# Patient Record
Sex: MALE | Race: WHITE | NOT HISPANIC OR LATINO | Employment: OTHER | URBAN - METROPOLITAN AREA
[De-identification: names, ages, dates, MRNs, and addresses within clinical notes are randomized per-mention and may not be internally consistent; named-entity substitution may affect disease eponyms.]

---

## 2017-05-22 ENCOUNTER — TRANSCRIBE ORDERS (OUTPATIENT)
Dept: LAB | Facility: HOSPITAL | Age: 62
End: 2017-05-22

## 2017-05-22 ENCOUNTER — LAB (OUTPATIENT)
Dept: LAB | Facility: HOSPITAL | Age: 62
End: 2017-05-22

## 2017-05-22 DIAGNOSIS — E55.9 UNSPECIFIED VITAMIN D DEFICIENCY: ICD-10-CM

## 2017-05-22 DIAGNOSIS — E11.9 DIABETES MELLITUS WITHOUT COMPLICATION (HCC): ICD-10-CM

## 2017-05-22 DIAGNOSIS — E03.9 UNSPECIFIED HYPOTHYROIDISM: ICD-10-CM

## 2017-05-22 DIAGNOSIS — E03.9 UNSPECIFIED HYPOTHYROIDISM: Primary | ICD-10-CM

## 2017-05-22 LAB
ALBUMIN SERPL-MCNC: 3.6 G/DL (ref 3.2–4.8)
ALBUMIN/GLOB SERPL: 1.3 G/DL (ref 1.5–2.5)
ALP SERPL-CCNC: 108 U/L (ref 25–100)
ALT SERPL W P-5'-P-CCNC: 40 U/L (ref 7–40)
ANION GAP SERPL CALCULATED.3IONS-SCNC: 2 MMOL/L (ref 3–11)
AST SERPL-CCNC: 34 U/L (ref 0–33)
BILIRUB SERPL-MCNC: 0.4 MG/DL (ref 0.3–1.2)
BUN BLD-MCNC: 54 MG/DL (ref 9–23)
BUN/CREAT SERPL: 9.8 (ref 7–25)
CALCIUM SPEC-SCNC: 9.2 MG/DL (ref 8.7–10.4)
CHLORIDE SERPL-SCNC: 105 MMOL/L (ref 99–109)
CO2 SERPL-SCNC: 32 MMOL/L (ref 20–31)
CREAT BLD-MCNC: 5.5 MG/DL (ref 0.6–1.3)
GFR SERPL CREATININE-BSD FRML MDRD: 11 ML/MIN/1.73
GLOBULIN UR ELPH-MCNC: 2.7 GM/DL
GLUCOSE BLD-MCNC: 114 MG/DL (ref 70–100)
HBA1C MFR BLD: 6 % (ref 4.8–5.6)
POTASSIUM BLD-SCNC: 4.3 MMOL/L (ref 3.5–5.5)
PROT SERPL-MCNC: 6.3 G/DL (ref 5.7–8.2)
SODIUM BLD-SCNC: 139 MMOL/L (ref 132–146)
T4 FREE SERPL-MCNC: 1.24 NG/DL (ref 0.89–1.76)
TSH SERPL DL<=0.05 MIU/L-ACNC: 1 MIU/ML (ref 0.35–5.35)

## 2017-05-22 PROCEDURE — 83036 HEMOGLOBIN GLYCOSYLATED A1C: CPT | Performed by: FAMILY MEDICINE

## 2017-05-22 PROCEDURE — 84439 ASSAY OF FREE THYROXINE: CPT | Performed by: FAMILY MEDICINE

## 2017-05-22 PROCEDURE — 80053 COMPREHEN METABOLIC PANEL: CPT | Performed by: FAMILY MEDICINE

## 2017-05-22 PROCEDURE — 36415 COLL VENOUS BLD VENIPUNCTURE: CPT

## 2017-05-22 PROCEDURE — 84443 ASSAY THYROID STIM HORMONE: CPT | Performed by: FAMILY MEDICINE

## 2017-05-22 PROCEDURE — 84481 FREE ASSAY (FT-3): CPT | Performed by: FAMILY MEDICINE

## 2017-05-22 PROCEDURE — 83970 ASSAY OF PARATHORMONE: CPT | Performed by: FAMILY MEDICINE

## 2017-05-23 LAB
PTH-INTACT SERPL-MCNC: 181 PG/ML (ref 15–65)
T3FREE SERPL-MCNC: 2.5 PG/ML (ref 2–4.4)

## 2017-09-29 ENCOUNTER — TRANSCRIBE ORDERS (OUTPATIENT)
Dept: LAB | Facility: HOSPITAL | Age: 62
End: 2017-09-29

## 2017-09-29 ENCOUNTER — LAB (OUTPATIENT)
Dept: LAB | Facility: HOSPITAL | Age: 62
End: 2017-09-29

## 2017-09-29 DIAGNOSIS — R80.8 OTHER PROTEINURIA: ICD-10-CM

## 2017-09-29 DIAGNOSIS — B19.20 UNSPECIFIED VIRAL HEPATITIS C WITHOUT HEPATIC COMA: ICD-10-CM

## 2017-09-29 DIAGNOSIS — N18.6 END STAGE RENAL DISEASE (HCC): Primary | ICD-10-CM

## 2017-09-29 DIAGNOSIS — E13.8 DIABETES MELLITUS OF OTHER TYPE WITH COMPLICATION: ICD-10-CM

## 2017-09-29 DIAGNOSIS — I10 ESSENTIAL HYPERTENSION, MALIGNANT: ICD-10-CM

## 2017-09-29 DIAGNOSIS — N18.6 END STAGE RENAL DISEASE (HCC): ICD-10-CM

## 2017-09-29 LAB — URATE SERPL-MCNC: 5.6 MG/DL (ref 3.7–9.2)

## 2017-09-29 PROCEDURE — 84165 PROTEIN E-PHORESIS SERUM: CPT | Performed by: INTERNAL MEDICINE

## 2017-09-29 PROCEDURE — 87040 BLOOD CULTURE FOR BACTERIA: CPT | Performed by: INTERNAL MEDICINE

## 2017-09-29 PROCEDURE — 84550 ASSAY OF BLOOD/URIC ACID: CPT | Performed by: INTERNAL MEDICINE

## 2017-09-29 PROCEDURE — 80171 DRUG SCREEN QUANT GABAPENTIN: CPT | Performed by: INTERNAL MEDICINE

## 2017-09-29 PROCEDURE — 36415 COLL VENOUS BLD VENIPUNCTURE: CPT

## 2017-09-29 PROCEDURE — 84155 ASSAY OF PROTEIN SERUM: CPT | Performed by: INTERNAL MEDICINE

## 2017-09-29 PROCEDURE — 86334 IMMUNOFIX E-PHORESIS SERUM: CPT | Performed by: INTERNAL MEDICINE

## 2017-10-03 LAB
ALBUMIN SERPL-MCNC: 3.4 G/DL (ref 2.9–4.4)
ALBUMIN/GLOB SERPL: 1 {RATIO} (ref 0.7–1.7)
ALPHA1 GLOB FLD ELPH-MCNC: 0.4 G/DL (ref 0–0.4)
ALPHA2 GLOB SERPL ELPH-MCNC: 1 G/DL (ref 0.4–1)
B-GLOBULIN SERPL ELPH-MCNC: 0.9 G/DL (ref 0.7–1.3)
GABAPENTIN SERPLBLD-MCNC: 5.2 UG/ML (ref 4–16)
GAMMA GLOB SERPL ELPH-MCNC: 1.2 G/DL (ref 0.4–1.8)
GLOBULIN SER CALC-MCNC: 3.5 G/DL (ref 2.2–3.9)
IGA SERPL-MCNC: 215 MG/DL (ref 61–437)
IGG SERPL-MCNC: 1039 MG/DL (ref 700–1600)
IGM SERPL-MCNC: 59 MG/DL (ref 20–172)
INTERPRETATION SERPL IEP-IMP: NORMAL
Lab: NORMAL
M-SPIKE: NORMAL G/DL
PROT SERPL-MCNC: 6.9 G/DL (ref 6–8.5)

## 2017-10-04 LAB — BACTERIA SPEC AEROBE CULT: NORMAL

## 2018-07-12 ENCOUNTER — TRANSCRIBE ORDERS (OUTPATIENT)
Dept: LAB | Facility: HOSPITAL | Age: 63
End: 2018-07-12

## 2018-07-12 ENCOUNTER — LAB (OUTPATIENT)
Dept: LAB | Facility: HOSPITAL | Age: 63
End: 2018-07-12

## 2018-07-12 DIAGNOSIS — N13.8 ENLARGED PROSTATE WITH URINARY OBSTRUCTION: Primary | ICD-10-CM

## 2018-07-12 DIAGNOSIS — N40.1 ENLARGED PROSTATE WITH URINARY OBSTRUCTION: ICD-10-CM

## 2018-07-12 DIAGNOSIS — N40.1 ENLARGED PROSTATE WITH URINARY OBSTRUCTION: Primary | ICD-10-CM

## 2018-07-12 DIAGNOSIS — E55.9 AVITAMINOSIS D: ICD-10-CM

## 2018-07-12 DIAGNOSIS — N13.8 ENLARGED PROSTATE WITH URINARY OBSTRUCTION: ICD-10-CM

## 2018-07-12 LAB
25(OH)D3 SERPL-MCNC: 32.1 NG/ML
ALBUMIN SERPL-MCNC: 4 G/DL (ref 3.2–4.8)
ALBUMIN/GLOB SERPL: 1.5 G/DL (ref 1.5–2.5)
ALP SERPL-CCNC: 148 U/L (ref 25–100)
ALT SERPL W P-5'-P-CCNC: 50 U/L (ref 7–40)
ANION GAP SERPL CALCULATED.3IONS-SCNC: 12 MMOL/L (ref 3–11)
ARTICHOKE IGE QN: 22 MG/DL (ref 0–130)
AST SERPL-CCNC: 41 U/L (ref 0–33)
BASOPHILS # BLD AUTO: 0.06 10*3/MM3 (ref 0–0.2)
BASOPHILS NFR BLD AUTO: 0.5 % (ref 0–1)
BILIRUB SERPL-MCNC: 0.4 MG/DL (ref 0.3–1.2)
BUN BLD-MCNC: 50 MG/DL (ref 9–23)
BUN/CREAT SERPL: 7.8 (ref 7–25)
CA-I SERPL ISE-MCNC: 1.17 MMOL/L (ref 1.12–1.32)
CALCIUM SPEC-SCNC: 8.5 MG/DL (ref 8.7–10.4)
CHLORIDE SERPL-SCNC: 105 MMOL/L (ref 99–109)
CHOLEST SERPL-MCNC: 93 MG/DL (ref 0–200)
CK SERPL-CCNC: 77 U/L (ref 26–174)
CO2 SERPL-SCNC: 24 MMOL/L (ref 20–31)
CREAT BLD-MCNC: 6.37 MG/DL (ref 0.6–1.3)
DEPRECATED RDW RBC AUTO: 49.9 FL (ref 37–54)
EOSINOPHIL # BLD AUTO: 0.88 10*3/MM3 (ref 0–0.3)
EOSINOPHIL NFR BLD AUTO: 7.8 % (ref 0–3)
ERYTHROCYTE [DISTWIDTH] IN BLOOD BY AUTOMATED COUNT: 14.5 % (ref 11.3–14.5)
FERRITIN SERPL-MCNC: 70 NG/ML (ref 22–322)
FOLATE SERPL-MCNC: >24 NG/ML (ref 3.2–20)
GFR SERPL CREATININE-BSD FRML MDRD: 9 ML/MIN/1.73
GLOBULIN UR ELPH-MCNC: 2.6 GM/DL
GLUCOSE BLD-MCNC: 68 MG/DL (ref 70–100)
HBA1C MFR BLD: 6.9 % (ref 4.8–5.6)
HCT VFR BLD AUTO: 41.5 % (ref 38.9–50.9)
HDLC SERPL-MCNC: 26 MG/DL (ref 40–60)
HGB BLD-MCNC: 13.7 G/DL (ref 13.1–17.5)
IMM GRANULOCYTES # BLD: 0.15 10*3/MM3 (ref 0–0.03)
IMM GRANULOCYTES NFR BLD: 1.3 % (ref 0–0.6)
IRON 24H UR-MRATE: 71 MCG/DL (ref 50–175)
IRON SATN MFR SERPL: 17 % (ref 20–50)
LYMPHOCYTES # BLD AUTO: 2.97 10*3/MM3 (ref 0.6–4.8)
LYMPHOCYTES NFR BLD AUTO: 26.4 % (ref 24–44)
MCH RBC QN AUTO: 31.5 PG (ref 27–31)
MCHC RBC AUTO-ENTMCNC: 33 G/DL (ref 32–36)
MCV RBC AUTO: 95.4 FL (ref 80–99)
MONOCYTES # BLD AUTO: 1.09 10*3/MM3 (ref 0–1)
MONOCYTES NFR BLD AUTO: 9.7 % (ref 0–12)
NEUTROPHILS # BLD AUTO: 6.26 10*3/MM3 (ref 1.5–8.3)
NEUTROPHILS NFR BLD AUTO: 55.6 % (ref 41–71)
PHOSPHATE SERPL-MCNC: 5.2 MG/DL (ref 2.4–5.1)
PLATELET # BLD AUTO: 255 10*3/MM3 (ref 150–450)
PMV BLD AUTO: 10.6 FL (ref 6–12)
POTASSIUM BLD-SCNC: 4.1 MMOL/L (ref 3.5–5.5)
PROT SERPL-MCNC: 6.6 G/DL (ref 5.7–8.2)
PTH-INTACT SERPL-MCNC: 331.8 PG/ML (ref 11–80)
RBC # BLD AUTO: 4.35 10*6/MM3 (ref 4.2–5.76)
SODIUM BLD-SCNC: 141 MMOL/L (ref 132–146)
T4 FREE SERPL-MCNC: 1.04 NG/DL (ref 0.89–1.76)
TIBC SERPL-MCNC: 413 MCG/DL (ref 250–450)
TRIGL SERPL-MCNC: 314 MG/DL (ref 0–150)
TSH SERPL DL<=0.05 MIU/L-ACNC: 1.57 MIU/ML (ref 0.35–5.35)
VIT B12 BLD-MCNC: 555 PG/ML (ref 211–911)
WBC NRBC COR # BLD: 11.26 10*3/MM3 (ref 3.5–10.8)

## 2018-07-12 PROCEDURE — 82652 VIT D 1 25-DIHYDROXY: CPT

## 2018-07-12 PROCEDURE — 82550 ASSAY OF CK (CPK): CPT

## 2018-07-12 PROCEDURE — 80061 LIPID PANEL: CPT

## 2018-07-12 PROCEDURE — 82746 ASSAY OF FOLIC ACID SERUM: CPT

## 2018-07-12 PROCEDURE — 83550 IRON BINDING TEST: CPT

## 2018-07-12 PROCEDURE — 84439 ASSAY OF FREE THYROXINE: CPT

## 2018-07-12 PROCEDURE — G0103 PSA SCREENING: HCPCS

## 2018-07-12 PROCEDURE — 82330 ASSAY OF CALCIUM: CPT

## 2018-07-12 PROCEDURE — 36415 COLL VENOUS BLD VENIPUNCTURE: CPT

## 2018-07-12 PROCEDURE — 83540 ASSAY OF IRON: CPT

## 2018-07-12 PROCEDURE — 85025 COMPLETE CBC W/AUTO DIFF WBC: CPT

## 2018-07-12 PROCEDURE — 82306 VITAMIN D 25 HYDROXY: CPT

## 2018-07-12 PROCEDURE — 84481 FREE ASSAY (FT-3): CPT

## 2018-07-12 PROCEDURE — 36415 COLL VENOUS BLD VENIPUNCTURE: CPT | Performed by: FAMILY MEDICINE

## 2018-07-12 PROCEDURE — 83036 HEMOGLOBIN GLYCOSYLATED A1C: CPT

## 2018-07-12 PROCEDURE — 82607 VITAMIN B-12: CPT

## 2018-07-12 PROCEDURE — 80053 COMPREHEN METABOLIC PANEL: CPT | Performed by: FAMILY MEDICINE

## 2018-07-12 PROCEDURE — 84100 ASSAY OF PHOSPHORUS: CPT

## 2018-07-12 PROCEDURE — 82728 ASSAY OF FERRITIN: CPT

## 2018-07-12 PROCEDURE — 84443 ASSAY THYROID STIM HORMONE: CPT

## 2018-07-12 PROCEDURE — 83970 ASSAY OF PARATHORMONE: CPT

## 2018-07-12 PROCEDURE — 84153 ASSAY OF PSA TOTAL: CPT

## 2018-07-13 LAB
1,25(OH)2D3 SERPL-MCNC: 12.2 PG/ML (ref 19.9–79.3)
T3FREE SERPL-MCNC: 2.9 PG/ML (ref 2–4.4)

## 2018-08-21 ENCOUNTER — LAB (OUTPATIENT)
Dept: LAB | Facility: HOSPITAL | Age: 63
End: 2018-08-21

## 2018-08-21 ENCOUNTER — HOSPITAL ENCOUNTER (OUTPATIENT)
Dept: CARDIOLOGY | Facility: HOSPITAL | Age: 63
Discharge: HOME | End: 2018-08-21
Attending: OTOLARYNGOLOGY | Admitting: OTOLARYNGOLOGY

## 2018-08-21 ENCOUNTER — TRANSCRIBE ORDERS (OUTPATIENT)
Dept: ADMINISTRATIVE | Facility: HOSPITAL | Age: 63
End: 2018-08-21

## 2018-08-21 ENCOUNTER — TRANSCRIBE ORDERS (OUTPATIENT)
Dept: LAB | Facility: HOSPITAL | Age: 63
End: 2018-08-21

## 2018-08-21 DIAGNOSIS — Z01.818 PRE-OP TESTING: ICD-10-CM

## 2018-08-21 DIAGNOSIS — I10 HYPERTENSION, UNSPECIFIED TYPE: ICD-10-CM

## 2018-08-21 DIAGNOSIS — Z01.812 PRE-OPERATIVE LABORATORY EXAMINATION: Primary | ICD-10-CM

## 2018-08-21 DIAGNOSIS — Z01.812 PRE-OPERATIVE LABORATORY EXAMINATION: ICD-10-CM

## 2018-08-21 DIAGNOSIS — Z01.818 PRE-OP TESTING: Primary | ICD-10-CM

## 2018-08-21 LAB
ALBUMIN SERPL-MCNC: 3.81 G/DL (ref 3.2–4.8)
ALBUMIN/GLOB SERPL: 1.5 G/DL (ref 1.5–2.5)
ALP SERPL-CCNC: 134 U/L (ref 25–100)
ALT SERPL W P-5'-P-CCNC: 47 U/L (ref 7–40)
ANION GAP SERPL CALCULATED.3IONS-SCNC: 12 MMOL/L (ref 3–11)
AST SERPL-CCNC: 38 U/L (ref 0–33)
BASOPHILS # BLD AUTO: 0.06 10*3/MM3 (ref 0–0.2)
BASOPHILS NFR BLD AUTO: 0.6 % (ref 0–1)
BILIRUB SERPL-MCNC: 0.5 MG/DL (ref 0.3–1.2)
BUN BLD-MCNC: 50 MG/DL (ref 9–23)
BUN/CREAT SERPL: 8.9 (ref 7–25)
CALCIUM SPEC-SCNC: 8.5 MG/DL (ref 8.7–10.4)
CHLORIDE SERPL-SCNC: 98 MMOL/L (ref 99–109)
CO2 SERPL-SCNC: 27 MMOL/L (ref 20–31)
CREAT BLD-MCNC: 5.62 MG/DL (ref 0.6–1.3)
DEPRECATED RDW RBC AUTO: 47.1 FL (ref 37–54)
EOSINOPHIL # BLD AUTO: 0.71 10*3/MM3 (ref 0–0.3)
EOSINOPHIL NFR BLD AUTO: 6.7 % (ref 0–3)
ERYTHROCYTE [DISTWIDTH] IN BLOOD BY AUTOMATED COUNT: 14 % (ref 11.3–14.5)
GFR SERPL CREATININE-BSD FRML MDRD: 10 ML/MIN/1.73
GLOBULIN UR ELPH-MCNC: 2.6 GM/DL
GLUCOSE BLD-MCNC: 186 MG/DL (ref 70–100)
HCT VFR BLD AUTO: 38.9 % (ref 38.9–50.9)
HGB BLD-MCNC: 12.9 G/DL (ref 13.1–17.5)
IMM GRANULOCYTES # BLD: 0.19 10*3/MM3 (ref 0–0.03)
IMM GRANULOCYTES NFR BLD: 1.8 % (ref 0–0.6)
LYMPHOCYTES # BLD AUTO: 2.18 10*3/MM3 (ref 0.6–4.8)
LYMPHOCYTES NFR BLD AUTO: 20.5 % (ref 24–44)
MCH RBC QN AUTO: 31 PG (ref 27–31)
MCHC RBC AUTO-ENTMCNC: 33.2 G/DL (ref 32–36)
MCV RBC AUTO: 93.5 FL (ref 80–99)
MONOCYTES # BLD AUTO: 1.13 10*3/MM3 (ref 0–1)
MONOCYTES NFR BLD AUTO: 10.6 % (ref 0–12)
NEUTROPHILS # BLD AUTO: 6.57 10*3/MM3 (ref 1.5–8.3)
NEUTROPHILS NFR BLD AUTO: 61.6 % (ref 41–71)
PLATELET # BLD AUTO: 257 10*3/MM3 (ref 150–450)
PMV BLD AUTO: 10.6 FL (ref 6–12)
POTASSIUM BLD-SCNC: 4.2 MMOL/L (ref 3.5–5.5)
PROT SERPL-MCNC: 6.4 G/DL (ref 5.7–8.2)
RBC # BLD AUTO: 4.16 10*6/MM3 (ref 4.2–5.76)
SODIUM BLD-SCNC: 137 MMOL/L (ref 132–146)
WBC NRBC COR # BLD: 10.65 10*3/MM3 (ref 3.5–10.8)

## 2018-08-21 PROCEDURE — 93005 ELECTROCARDIOGRAM TRACING: CPT | Performed by: OTOLARYNGOLOGY

## 2018-08-21 PROCEDURE — 36415 COLL VENOUS BLD VENIPUNCTURE: CPT

## 2018-08-21 PROCEDURE — 93010 ELECTROCARDIOGRAM REPORT: CPT | Performed by: INTERNAL MEDICINE

## 2018-08-21 PROCEDURE — 80053 COMPREHEN METABOLIC PANEL: CPT

## 2018-08-21 PROCEDURE — 85025 COMPLETE CBC W/AUTO DIFF WBC: CPT

## 2018-09-05 LAB
PSA SERPL-MCNC: 0.78 NG/ML (ref 0–4)
PSA SERPL-MCNC: NORMAL NG/ML (ref 0–4)

## 2019-02-07 ENCOUNTER — TRANSCRIBE ORDERS (OUTPATIENT)
Dept: ADMINISTRATIVE | Facility: HOSPITAL | Age: 64
End: 2019-02-07

## 2019-02-07 DIAGNOSIS — R91.1 NODULE OF LEFT LUNG: Primary | ICD-10-CM

## 2019-02-13 ENCOUNTER — HOSPITAL ENCOUNTER (OUTPATIENT)
Dept: CT IMAGING | Facility: HOSPITAL | Age: 64
Discharge: HOME | End: 2019-02-13
Admitting: FAMILY MEDICINE

## 2019-02-13 DIAGNOSIS — R91.1 NODULE OF LEFT LUNG: ICD-10-CM

## 2019-02-13 PROCEDURE — 71250 CT THORAX DX C-: CPT

## 2019-02-18 ENCOUNTER — TRANSCRIBE ORDERS (OUTPATIENT)
Dept: LAB | Facility: HOSPITAL | Age: 64
End: 2019-02-18

## 2019-02-18 ENCOUNTER — LAB (OUTPATIENT)
Dept: LAB | Facility: HOSPITAL | Age: 64
End: 2019-02-18

## 2019-02-18 DIAGNOSIS — I51.9 MYXEDEMA HEART DISEASE: ICD-10-CM

## 2019-02-18 DIAGNOSIS — I51.9 MYXEDEMA HEART DISEASE: Primary | ICD-10-CM

## 2019-02-18 DIAGNOSIS — E03.9 MYXEDEMA HEART DISEASE: Primary | ICD-10-CM

## 2019-02-18 DIAGNOSIS — E03.9 MYXEDEMA HEART DISEASE: ICD-10-CM

## 2019-02-18 LAB
ALBUMIN SERPL-MCNC: 4.09 G/DL (ref 3.2–4.8)
ALBUMIN/GLOB SERPL: 1.8 G/DL (ref 1.5–2.5)
ALP SERPL-CCNC: 158 U/L (ref 25–100)
ALT SERPL W P-5'-P-CCNC: 52 U/L (ref 7–40)
ANION GAP SERPL CALCULATED.3IONS-SCNC: 9 MMOL/L (ref 3–11)
AST SERPL-CCNC: 37 U/L (ref 0–33)
BILIRUB SERPL-MCNC: 0.4 MG/DL (ref 0.3–1.2)
BUN BLD-MCNC: 69 MG/DL (ref 9–23)
BUN/CREAT SERPL: 10.4 (ref 7–25)
CALCIUM SPEC-SCNC: 9.4 MG/DL (ref 8.7–10.4)
CHLORIDE SERPL-SCNC: 101 MMOL/L (ref 99–109)
CO2 SERPL-SCNC: 29 MMOL/L (ref 20–31)
CREAT BLD-MCNC: 6.63 MG/DL (ref 0.6–1.3)
DEPRECATED RDW RBC AUTO: 49 FL (ref 37–54)
ERYTHROCYTE [DISTWIDTH] IN BLOOD BY AUTOMATED COUNT: 14.2 % (ref 11.3–14.5)
GFR SERPL CREATININE-BSD FRML MDRD: 9 ML/MIN/1.73
GLOBULIN UR ELPH-MCNC: 2.3 GM/DL
GLUCOSE BLD-MCNC: 79 MG/DL (ref 70–100)
HCT VFR BLD AUTO: 36.7 % (ref 38.9–50.9)
HGB BLD-MCNC: 12 G/DL (ref 13.1–17.5)
MCH RBC QN AUTO: 31.3 PG (ref 27–31)
MCHC RBC AUTO-ENTMCNC: 32.7 G/DL (ref 32–36)
MCV RBC AUTO: 95.6 FL (ref 80–99)
PLATELET # BLD AUTO: 294 10*3/MM3 (ref 150–450)
PMV BLD AUTO: 11.1 FL (ref 6–12)
POTASSIUM BLD-SCNC: 4 MMOL/L (ref 3.5–5.5)
PROT SERPL-MCNC: 6.4 G/DL (ref 5.7–8.2)
RBC # BLD AUTO: 3.84 10*6/MM3 (ref 4.2–5.76)
SODIUM BLD-SCNC: 139 MMOL/L (ref 132–146)
TSH SERPL DL<=0.05 MIU/L-ACNC: 3.52 MIU/ML (ref 0.35–5.35)
WBC NRBC COR # BLD: 9.3 10*3/MM3 (ref 3.5–10.8)

## 2019-02-18 PROCEDURE — 80053 COMPREHEN METABOLIC PANEL: CPT

## 2019-02-18 PROCEDURE — 36415 COLL VENOUS BLD VENIPUNCTURE: CPT

## 2019-02-18 PROCEDURE — 85027 COMPLETE CBC AUTOMATED: CPT

## 2019-02-18 PROCEDURE — 84443 ASSAY THYROID STIM HORMONE: CPT

## 2019-08-14 ENCOUNTER — LAB (OUTPATIENT)
Dept: LAB | Facility: HOSPITAL | Age: 64
End: 2019-08-14

## 2019-08-14 ENCOUNTER — TRANSCRIBE ORDERS (OUTPATIENT)
Dept: LAB | Facility: HOSPITAL | Age: 64
End: 2019-08-14

## 2019-08-14 DIAGNOSIS — E03.9 MYXEDEMA HEART DISEASE: ICD-10-CM

## 2019-08-14 DIAGNOSIS — I51.9 MYXEDEMA HEART DISEASE: ICD-10-CM

## 2019-08-14 DIAGNOSIS — E11.8 TYPE 2 DIABETES MELLITUS WITH COMPLICATION, WITH LONG-TERM CURRENT USE OF INSULIN (HCC): ICD-10-CM

## 2019-08-14 DIAGNOSIS — D64.9 ANEMIA, UNSPECIFIED TYPE: Primary | ICD-10-CM

## 2019-08-14 DIAGNOSIS — D64.9 ANEMIA, UNSPECIFIED TYPE: ICD-10-CM

## 2019-08-14 DIAGNOSIS — Z79.4 TYPE 2 DIABETES MELLITUS WITH COMPLICATION, WITH LONG-TERM CURRENT USE OF INSULIN (HCC): ICD-10-CM

## 2019-08-14 LAB
ALBUMIN SERPL-MCNC: 3.4 G/DL (ref 3.5–5.2)
ALBUMIN/GLOB SERPL: 1.4 G/DL
ALP SERPL-CCNC: 144 U/L (ref 39–117)
ALT SERPL W P-5'-P-CCNC: 118 U/L (ref 1–41)
ANION GAP SERPL CALCULATED.3IONS-SCNC: 16 MMOL/L (ref 5–15)
AST SERPL-CCNC: 51 U/L (ref 1–40)
BILIRUB SERPL-MCNC: 0.3 MG/DL (ref 0.2–1.2)
BUN BLD-MCNC: 99 MG/DL (ref 8–23)
BUN/CREAT SERPL: 18.6 (ref 7–25)
CALCIUM SPEC-SCNC: 8.6 MG/DL (ref 8.6–10.5)
CHLORIDE SERPL-SCNC: 98 MMOL/L (ref 98–107)
CO2 SERPL-SCNC: 22 MMOL/L (ref 22–29)
CREAT BLD-MCNC: 5.33 MG/DL (ref 0.76–1.27)
DEPRECATED RDW RBC AUTO: 45.4 FL (ref 37–54)
ERYTHROCYTE [DISTWIDTH] IN BLOOD BY AUTOMATED COUNT: 13.5 % (ref 12.3–15.4)
FERRITIN SERPL-MCNC: 304 NG/ML (ref 30–400)
GFR SERPL CREATININE-BSD FRML MDRD: 11 ML/MIN/1.73
GFR SERPL CREATININE-BSD FRML MDRD: ABNORMAL ML/MIN/1.73
GLOBULIN UR ELPH-MCNC: 2.5 GM/DL
GLUCOSE BLD-MCNC: 230 MG/DL (ref 65–99)
HBA1C MFR BLD: 8.85 % (ref 4.8–5.6)
HCT VFR BLD AUTO: 32.2 % (ref 37.5–51)
HGB BLD-MCNC: 10.4 G/DL (ref 13–17.7)
MCH RBC QN AUTO: 29.6 PG (ref 26.6–33)
MCHC RBC AUTO-ENTMCNC: 32.3 G/DL (ref 31.5–35.7)
MCV RBC AUTO: 91.7 FL (ref 79–97)
PLATELET # BLD AUTO: 211 10*3/MM3 (ref 140–450)
PMV BLD AUTO: 10.7 FL (ref 6–12)
POTASSIUM BLD-SCNC: 4.1 MMOL/L (ref 3.5–5.2)
PROT SERPL-MCNC: 5.9 G/DL (ref 6–8.5)
RBC # BLD AUTO: 3.51 10*6/MM3 (ref 4.14–5.8)
SODIUM BLD-SCNC: 136 MMOL/L (ref 136–145)
T3FREE SERPL-MCNC: 2 PG/ML (ref 2–4.4)
T4 FREE SERPL-MCNC: 1.12 NG/DL (ref 0.93–1.7)
TSH SERPL DL<=0.05 MIU/L-ACNC: 0.32 MIU/ML (ref 0.27–4.2)
WBC NRBC COR # BLD: 11.09 10*3/MM3 (ref 3.4–10.8)

## 2019-08-14 PROCEDURE — 84481 FREE ASSAY (FT-3): CPT

## 2019-08-14 PROCEDURE — 84439 ASSAY OF FREE THYROXINE: CPT | Performed by: FAMILY MEDICINE

## 2019-08-14 PROCEDURE — 82728 ASSAY OF FERRITIN: CPT

## 2019-08-14 PROCEDURE — 36415 COLL VENOUS BLD VENIPUNCTURE: CPT | Performed by: FAMILY MEDICINE

## 2019-08-14 PROCEDURE — 85027 COMPLETE CBC AUTOMATED: CPT | Performed by: FAMILY MEDICINE

## 2019-08-14 PROCEDURE — 83036 HEMOGLOBIN GLYCOSYLATED A1C: CPT | Performed by: FAMILY MEDICINE

## 2019-08-14 PROCEDURE — 80053 COMPREHEN METABOLIC PANEL: CPT | Performed by: FAMILY MEDICINE

## 2019-08-14 PROCEDURE — 84443 ASSAY THYROID STIM HORMONE: CPT

## 2019-12-02 ENCOUNTER — LAB (OUTPATIENT)
Dept: LAB | Facility: HOSPITAL | Age: 64
End: 2019-12-02

## 2019-12-02 ENCOUNTER — TRANSCRIBE ORDERS (OUTPATIENT)
Dept: LAB | Facility: HOSPITAL | Age: 64
End: 2019-12-02

## 2019-12-02 DIAGNOSIS — I51.9 MYXEDEMA HEART DISEASE: ICD-10-CM

## 2019-12-02 DIAGNOSIS — E03.9 MYXEDEMA HEART DISEASE: ICD-10-CM

## 2019-12-02 DIAGNOSIS — F19.20 COMBINATIONS OF DRUG DEPENDENCE EXCLUDING OPIOID TYPE DRUG, CONTINUOUS (HCC): ICD-10-CM

## 2019-12-02 DIAGNOSIS — F19.20 COMBINATIONS OF DRUG DEPENDENCE EXCLUDING OPIOID TYPE DRUG, CONTINUOUS (HCC): Primary | ICD-10-CM

## 2019-12-02 DIAGNOSIS — E11.9 DIABETES MELLITUS WITH NO COMPLICATION (HCC): ICD-10-CM

## 2019-12-02 LAB
ALBUMIN SERPL-MCNC: 3.7 G/DL (ref 3.5–5.2)
ALBUMIN/GLOB SERPL: 1.4 G/DL
ALP SERPL-CCNC: 96 U/L (ref 39–117)
ALT SERPL W P-5'-P-CCNC: 59 U/L (ref 1–41)
AMPHET+METHAMPHET UR QL: NEGATIVE
AMPHETAMINES UR QL: NEGATIVE
ANION GAP SERPL CALCULATED.3IONS-SCNC: 15.7 MMOL/L (ref 5–15)
AST SERPL-CCNC: 35 U/L (ref 1–40)
BARBITURATES UR QL SCN: NEGATIVE
BENZODIAZ UR QL SCN: NEGATIVE
BILIRUB SERPL-MCNC: 0.3 MG/DL (ref 0.2–1.2)
BUN BLD-MCNC: 58 MG/DL (ref 8–23)
BUN/CREAT SERPL: 8.3 (ref 7–25)
BUPRENORPHINE SERPL-MCNC: NEGATIVE NG/ML
CALCIUM SPEC-SCNC: 8.9 MG/DL (ref 8.6–10.5)
CANNABINOIDS SERPL QL: NEGATIVE
CHLORIDE SERPL-SCNC: 96 MMOL/L (ref 98–107)
CO2 SERPL-SCNC: 25.3 MMOL/L (ref 22–29)
COCAINE UR QL: NEGATIVE
CREAT BLD-MCNC: 7 MG/DL (ref 0.76–1.27)
ERYTHROCYTE [SEDIMENTATION RATE] IN BLOOD: 31 MM/HR (ref 0–20)
GFR SERPL CREATININE-BSD FRML MDRD: 8 ML/MIN/1.73
GFR SERPL CREATININE-BSD FRML MDRD: ABNORMAL ML/MIN/{1.73_M2}
GLOBULIN UR ELPH-MCNC: 2.7 GM/DL
GLUCOSE BLD-MCNC: 136 MG/DL (ref 65–99)
HBA1C MFR BLD: 7.5 % (ref 4.8–5.6)
METHADONE UR QL SCN: NEGATIVE
OPIATES UR QL: NEGATIVE
OXYCODONE UR QL SCN: NEGATIVE
PCP UR QL SCN: NEGATIVE
POTASSIUM BLD-SCNC: 4.6 MMOL/L (ref 3.5–5.2)
PROPOXYPH UR QL: NEGATIVE
PROT SERPL-MCNC: 6.4 G/DL (ref 6–8.5)
SODIUM BLD-SCNC: 137 MMOL/L (ref 136–145)
T3FREE SERPL-MCNC: 2.65 PG/ML (ref 2–4.4)
T4 FREE SERPL-MCNC: 1.08 NG/DL (ref 0.93–1.7)
TRICYCLICS UR QL SCN: NEGATIVE
TSH SERPL DL<=0.05 MIU/L-ACNC: 3.89 UIU/ML (ref 0.27–4.2)

## 2019-12-02 PROCEDURE — 84443 ASSAY THYROID STIM HORMONE: CPT

## 2019-12-02 PROCEDURE — 85652 RBC SED RATE AUTOMATED: CPT

## 2019-12-02 PROCEDURE — 84439 ASSAY OF FREE THYROXINE: CPT

## 2019-12-02 PROCEDURE — 80053 COMPREHEN METABOLIC PANEL: CPT | Performed by: FAMILY MEDICINE

## 2019-12-02 PROCEDURE — 36415 COLL VENOUS BLD VENIPUNCTURE: CPT | Performed by: FAMILY MEDICINE

## 2019-12-02 PROCEDURE — 80307 DRUG TEST PRSMV CHEM ANLYZR: CPT

## 2019-12-02 PROCEDURE — 84481 FREE ASSAY (FT-3): CPT

## 2019-12-02 PROCEDURE — 83036 HEMOGLOBIN GLYCOSYLATED A1C: CPT

## 2020-04-17 ENCOUNTER — APPOINTMENT (OUTPATIENT)
Dept: GENERAL RADIOLOGY | Facility: HOSPITAL | Age: 65
End: 2020-04-17

## 2020-04-17 ENCOUNTER — HOSPITAL ENCOUNTER (EMERGENCY)
Facility: HOSPITAL | Age: 65
Discharge: HOME | End: 2020-04-17
Attending: EMERGENCY MEDICINE | Admitting: EMERGENCY MEDICINE

## 2020-04-17 VITALS
OXYGEN SATURATION: 99 % | DIASTOLIC BLOOD PRESSURE: 86 MMHG | WEIGHT: 220 LBS | TEMPERATURE: 99.1 F | SYSTOLIC BLOOD PRESSURE: 149 MMHG | HEIGHT: 76 IN | RESPIRATION RATE: 16 BRPM | BODY MASS INDEX: 26.79 KG/M2 | HEART RATE: 79 BPM

## 2020-04-17 DIAGNOSIS — R50.9 FEBRILE ILLNESS: Primary | ICD-10-CM

## 2020-04-17 DIAGNOSIS — L08.9 WOUND INFECTION: ICD-10-CM

## 2020-04-17 DIAGNOSIS — T14.8XXA WOUND INFECTION: ICD-10-CM

## 2020-04-17 DIAGNOSIS — Z20.828 EXPOSURE TO SARS-ASSOCIATED CORONAVIRUS: ICD-10-CM

## 2020-04-17 DIAGNOSIS — L08.9 FINGER INFECTION: ICD-10-CM

## 2020-04-17 DIAGNOSIS — N18.5 CHRONIC RENAL FAILURE, STAGE 5 (HCC): ICD-10-CM

## 2020-04-17 DIAGNOSIS — D72.829 LEUKOCYTOSIS, UNSPECIFIED TYPE: ICD-10-CM

## 2020-04-17 DIAGNOSIS — R79.82 ELEVATED C-REACTIVE PROTEIN (CRP): ICD-10-CM

## 2020-04-17 LAB
ALBUMIN SERPL-MCNC: 3.3 G/DL (ref 3.5–5.2)
ALBUMIN/GLOB SERPL: 1 G/DL
ALP SERPL-CCNC: 102 U/L (ref 39–117)
ALT SERPL W P-5'-P-CCNC: 28 U/L (ref 1–41)
ANION GAP SERPL CALCULATED.3IONS-SCNC: 14 MMOL/L (ref 5–15)
AST SERPL-CCNC: 20 U/L (ref 1–40)
B PARAPERT DNA SPEC QL NAA+PROBE: NOT DETECTED
B PERT DNA SPEC QL NAA+PROBE: NOT DETECTED
BACTERIA UR QL AUTO: NORMAL /HPF
BASOPHILS # BLD AUTO: 0.05 10*3/MM3 (ref 0–0.2)
BASOPHILS NFR BLD AUTO: 0.4 % (ref 0–1.5)
BILIRUB SERPL-MCNC: 0.3 MG/DL (ref 0.2–1.2)
BILIRUB UR QL STRIP: NEGATIVE
BUN BLD-MCNC: 63 MG/DL (ref 8–23)
BUN/CREAT SERPL: 6.9 (ref 7–25)
C PNEUM DNA NPH QL NAA+NON-PROBE: NOT DETECTED
CALCIUM SPEC-SCNC: 9 MG/DL (ref 8.6–10.5)
CHLORIDE SERPL-SCNC: 89 MMOL/L (ref 98–107)
CLARITY UR: ABNORMAL
CO2 SERPL-SCNC: 24 MMOL/L (ref 22–29)
COARSE GRAN CASTS URNS QL MICRO: NORMAL /LPF
COLOR UR: YELLOW
CREAT BLD-MCNC: 9.18 MG/DL (ref 0.76–1.27)
CRP SERPL-MCNC: 2.14 MG/DL (ref 0–0.5)
D-LACTATE SERPL-SCNC: 0.9 MMOL/L (ref 0.5–2)
DEPRECATED RDW RBC AUTO: 49 FL (ref 37–54)
EOSINOPHIL # BLD AUTO: 0.15 10*3/MM3 (ref 0–0.4)
EOSINOPHIL NFR BLD AUTO: 1.1 % (ref 0.3–6.2)
ERYTHROCYTE [DISTWIDTH] IN BLOOD BY AUTOMATED COUNT: 14.3 % (ref 12.3–15.4)
FERRITIN SERPL-MCNC: 343.1 NG/ML (ref 30–400)
FLUAV H1 2009 PAND RNA NPH QL NAA+PROBE: NOT DETECTED
FLUAV H1 HA GENE NPH QL NAA+PROBE: NOT DETECTED
FLUAV H3 RNA NPH QL NAA+PROBE: NOT DETECTED
FLUAV SUBTYP SPEC NAA+PROBE: NOT DETECTED
FLUBV RNA ISLT QL NAA+PROBE: NOT DETECTED
GFR SERPL CREATININE-BSD FRML MDRD: 6 ML/MIN/1.73
GFR SERPL CREATININE-BSD FRML MDRD: ABNORMAL ML/MIN/{1.73_M2}
GLOBULIN UR ELPH-MCNC: 3.3 GM/DL
GLUCOSE BLD-MCNC: 161 MG/DL (ref 65–99)
GLUCOSE UR STRIP-MCNC: ABNORMAL MG/DL
HADV DNA SPEC NAA+PROBE: NOT DETECTED
HCOV 229E RNA SPEC QL NAA+PROBE: NOT DETECTED
HCOV HKU1 RNA SPEC QL NAA+PROBE: NOT DETECTED
HCOV NL63 RNA SPEC QL NAA+PROBE: NOT DETECTED
HCOV OC43 RNA SPEC QL NAA+PROBE: NOT DETECTED
HCT VFR BLD AUTO: 36.2 % (ref 37.5–51)
HGB BLD-MCNC: 11.7 G/DL (ref 13–17.7)
HGB UR QL STRIP.AUTO: ABNORMAL
HMPV RNA NPH QL NAA+NON-PROBE: NOT DETECTED
HPIV1 RNA SPEC QL NAA+PROBE: NOT DETECTED
HPIV2 RNA SPEC QL NAA+PROBE: NOT DETECTED
HPIV3 RNA NPH QL NAA+PROBE: NOT DETECTED
HPIV4 P GENE NPH QL NAA+PROBE: NOT DETECTED
HYALINE CASTS UR QL AUTO: NORMAL /LPF
IMM GRANULOCYTES # BLD AUTO: 0.2 10*3/MM3 (ref 0–0.05)
IMM GRANULOCYTES NFR BLD AUTO: 1.4 % (ref 0–0.5)
KETONES UR QL STRIP: ABNORMAL
LDH SERPL-CCNC: 241 U/L (ref 135–225)
LEUKOCYTE ESTERASE UR QL STRIP.AUTO: NEGATIVE
LYMPHOCYTES # BLD AUTO: 1.37 10*3/MM3 (ref 0.7–3.1)
LYMPHOCYTES NFR BLD AUTO: 9.7 % (ref 19.6–45.3)
M PNEUMO IGG SER IA-ACNC: NOT DETECTED
MCH RBC QN AUTO: 30.7 PG (ref 26.6–33)
MCHC RBC AUTO-ENTMCNC: 32.3 G/DL (ref 31.5–35.7)
MCV RBC AUTO: 95 FL (ref 79–97)
MONOCYTES # BLD AUTO: 1.32 10*3/MM3 (ref 0.1–0.9)
MONOCYTES NFR BLD AUTO: 9.3 % (ref 5–12)
NEUTROPHILS # BLD AUTO: 11.08 10*3/MM3 (ref 1.7–7)
NEUTROPHILS NFR BLD AUTO: 78.1 % (ref 42.7–76)
NITRITE UR QL STRIP: NEGATIVE
NRBC BLD AUTO-RTO: 0 /100 WBC (ref 0–0.2)
PH UR STRIP.AUTO: <=5 [PH] (ref 5–8)
PLATELET # BLD AUTO: 245 10*3/MM3 (ref 140–450)
PMV BLD AUTO: 9.8 FL (ref 6–12)
POTASSIUM BLD-SCNC: 4.7 MMOL/L (ref 3.5–5.2)
PROCALCITONIN SERPL-MCNC: 0.31 NG/ML (ref 0.1–0.25)
PROT SERPL-MCNC: 6.6 G/DL (ref 6–8.5)
PROT UR QL STRIP: ABNORMAL
RBC # BLD AUTO: 3.81 10*6/MM3 (ref 4.14–5.8)
RBC # UR: NORMAL /HPF
REF LAB TEST METHOD: NORMAL
RHINOVIRUS RNA SPEC NAA+PROBE: NOT DETECTED
RSV RNA NPH QL NAA+NON-PROBE: NOT DETECTED
SODIUM BLD-SCNC: 127 MMOL/L (ref 136–145)
SP GR UR STRIP: 1.02 (ref 1–1.03)
SQUAMOUS #/AREA URNS HPF: NORMAL /HPF
UROBILINOGEN UR QL STRIP: ABNORMAL
WBC NRBC COR # BLD: 14.17 10*3/MM3 (ref 3.4–10.8)
WBC UR QL AUTO: NORMAL /HPF

## 2020-04-17 PROCEDURE — 96365 THER/PROPH/DIAG IV INF INIT: CPT

## 2020-04-17 PROCEDURE — 25010000002 GENTAMICIN PER 80 MG: Performed by: EMERGENCY MEDICINE

## 2020-04-17 PROCEDURE — 83605 ASSAY OF LACTIC ACID: CPT | Performed by: EMERGENCY MEDICINE

## 2020-04-17 PROCEDURE — 73140 X-RAY EXAM OF FINGER(S): CPT

## 2020-04-17 PROCEDURE — 71045 X-RAY EXAM CHEST 1 VIEW: CPT

## 2020-04-17 PROCEDURE — 81001 URINALYSIS AUTO W/SCOPE: CPT | Performed by: EMERGENCY MEDICINE

## 2020-04-17 PROCEDURE — 83615 LACTATE (LD) (LDH) ENZYME: CPT | Performed by: EMERGENCY MEDICINE

## 2020-04-17 PROCEDURE — 99284 EMERGENCY DEPT VISIT MOD MDM: CPT

## 2020-04-17 PROCEDURE — 85025 COMPLETE CBC W/AUTO DIFF WBC: CPT | Performed by: EMERGENCY MEDICINE

## 2020-04-17 PROCEDURE — 82728 ASSAY OF FERRITIN: CPT | Performed by: EMERGENCY MEDICINE

## 2020-04-17 PROCEDURE — 87040 BLOOD CULTURE FOR BACTERIA: CPT | Performed by: EMERGENCY MEDICINE

## 2020-04-17 PROCEDURE — 25010000002 VANCOMYCIN 10 G RECONSTITUTED SOLUTION: Performed by: EMERGENCY MEDICINE

## 2020-04-17 PROCEDURE — U0002 COVID-19 LAB TEST NON-CDC: HCPCS | Performed by: EMERGENCY MEDICINE

## 2020-04-17 PROCEDURE — 96366 THER/PROPH/DIAG IV INF ADDON: CPT

## 2020-04-17 PROCEDURE — U0003 INFECTIOUS AGENT DETECTION BY NUCLEIC ACID (DNA OR RNA); SEVERE ACUTE RESPIRATORY SYNDROME CORONAVIRUS 2 (SARS-COV-2) (CORONAVIRUS DISEASE [COVID-19]), AMPLIFIED PROBE TECHNIQUE, MAKING USE OF HIGH THROUGHPUT TECHNOLOGIES AS DESCRIBED BY CMS-2020-01-R: HCPCS | Performed by: EMERGENCY MEDICINE

## 2020-04-17 PROCEDURE — 84145 PROCALCITONIN (PCT): CPT | Performed by: EMERGENCY MEDICINE

## 2020-04-17 PROCEDURE — 96367 TX/PROPH/DG ADDL SEQ IV INF: CPT

## 2020-04-17 PROCEDURE — 80053 COMPREHEN METABOLIC PANEL: CPT | Performed by: EMERGENCY MEDICINE

## 2020-04-17 PROCEDURE — 86140 C-REACTIVE PROTEIN: CPT | Performed by: EMERGENCY MEDICINE

## 2020-04-17 PROCEDURE — 0100U HC BIOFIRE FILMARRAY RESP PANEL 2: CPT | Performed by: EMERGENCY MEDICINE

## 2020-04-17 RX ORDER — CEPHALEXIN 500 MG/1
500 CAPSULE ORAL 3 TIMES DAILY
Qty: 30 CAPSULE | Refills: 0 | Status: SHIPPED | OUTPATIENT
Start: 2020-04-17 | End: 2020-10-27

## 2020-04-17 RX ORDER — GENTAMICIN SULFATE 80 MG/100ML
80 INJECTION, SOLUTION INTRAVENOUS ONCE
Status: COMPLETED | OUTPATIENT
Start: 2020-04-17 | End: 2020-04-17

## 2020-04-17 RX ORDER — SULFAMETHOXAZOLE AND TRIMETHOPRIM 800; 160 MG/1; MG/1
1 TABLET ORAL EVERY OTHER DAY
Qty: 5 TABLET | Refills: 0 | Status: SHIPPED | OUTPATIENT
Start: 2020-04-17 | End: 2020-10-27

## 2020-04-17 RX ADMIN — GENTAMICIN SULFATE 80 MG: 80 INJECTION, SOLUTION INTRAVENOUS at 16:13

## 2020-04-17 RX ADMIN — SODIUM CHLORIDE 1250 MG: 9 INJECTION, SOLUTION INTRAVENOUS at 16:54

## 2020-04-17 RX ADMIN — Medication 3 ML: at 14:49

## 2020-04-17 NOTE — DISCHARGE INSTRUCTIONS
Continue your current medications, fluid management, and general care    Soak your finger twice a day in warm water.  Keep clean and dry.    Take the Keflex and Bactrim as prescribed.  You were treated with IV vancomycin and gentamicin today as discussed    I have sent a COVID test on you today.  Continue your excellent home quarantining until the COVID test is back and negative or as directed if your test is positive    Follow-up with Dr. Donaldson, infectious disease, as arranged by Dr. Holley on Monday    Continue follow-up with your peritoneal dialysis team.  The nephrologist today is already alerted them for your continued care    If your symptoms worsen including significant or increasing fevers, chills, lethargy, shortness of breath or any other significant symptoms return immediately to the ED for reevaluation and further care as discussed

## 2020-04-17 NOTE — ED PROVIDER NOTES
Subjective   Jaswant Henson is a 65 y.o. male who presents to the ED with c/o fever. The patient reports that he cut his left second finger on a cardboard box approximately 1 month ago and it has not been improving. He went to a walk in clinic last week where he was prescribed Cephalexin and Mupirocin cream but these medications did not work. The patient reports that over the past 3 days he has had a waxing and waning fever, reaching as high as 101.2 since onset as well as a high temperature of 100.3 in the last 24 hours. He complains of shaking chills but denies cough, congestion, and shortness of breath. The patient reports he has been in close contact with his daughter who has not been quarantining herself. He is currently on Plaquenil. The patient has a past medical history of hypertension, disease of thyroid gland, diabetes mellitus, and gout. His surgical history includes peritoneal dialysis catheter and hernia repair. There are no other acute complaints at this time.    Nightly peritoneal dialysis with no abdominal pain and clear PD fluid      History provided by:  Patient  Fever   Max temp prior to arrival:  101.2  Temp source:  Oral  Severity:  Moderate  Onset quality:  Sudden  Duration:  3 days  Timing:  Constant  Progression:  Waxing and waning  Chronicity:  New  Relieved by:  None tried  Worsened by:  Nothing  Ineffective treatments:  None tried  Associated symptoms: chills    Associated symptoms: no congestion, no cough, no rhinorrhea and no sore throat    Risk factors: immunosuppression    Risk factors: no hx of cancer, no recent sickness and no recent travel        Review of Systems   Constitutional: Positive for chills and fever.   HENT: Negative for congestion, rhinorrhea and sore throat.    Respiratory: Negative for cough and shortness of breath.    Musculoskeletal:        The patient complains of an injury to his left 2nd finger. The area is tender and swollen.   Skin: Positive for wound.    All other systems reviewed and are negative.      Past Medical History:   Diagnosis Date   • Diabetes mellitus (CMS/HCC)    • Disease of thyroid gland    • Gout    • Hypertension        Allergies   Allergen Reactions   • Bacitracin Rash   • Neosporin Af [Miconazole] Rash       Past Surgical History:   Procedure Laterality Date   • HERNIA REPAIR     • INSERTION PERITONEAL DIALYSIS CATHETER     • INSERTION PERITONEAL DIALYSIS CATHETER         No family history on file.    Social History     Socioeconomic History   • Marital status:      Spouse name: Not on file   • Number of children: Not on file   • Years of education: Not on file   • Highest education level: Not on file   Tobacco Use   • Smoking status: Never Smoker   • Smokeless tobacco: Never Used         Objective   Physical Exam   Constitutional: He is oriented to person, place, and time. He appears well-developed and well-nourished.  Non-toxic appearance. No distress.   HENT:   Head: Normocephalic and atraumatic.   Eyes: Conjunctivae are normal. No scleral icterus.   Neck: Normal range of motion. Neck supple.   Cardiovascular: Normal rate, regular rhythm and normal heart sounds.   No murmur heard.  Pulmonary/Chest: Effort normal and breath sounds normal. No respiratory distress.   Abdominal: Soft. There is no tenderness. There is no rebound and no guarding.   PD cath left abdomen without signs of infection.  Clear fluid in the PD cath   Musculoskeletal: Normal range of motion. He exhibits tenderness.        Left hand: He exhibits tenderness and swelling.   8x5 mm area below nail on the left 2nd pointer finger with mild swelling and tenderness to the distal phalanx of the digit. Other digits are without erythema. No lymphangitic streaking, warmtth, or tednerness on remainder of the left upper extremity. Swollen compared to distal phalanx on the right side.   Neurological: He is alert and oriented to person, place, and time.   Skin: Skin is warm and dry.    Psychiatric: He has a normal mood and affect. His behavior is normal.   Nursing note and vitals reviewed.      Procedures         ED Course  ED Course as of Apr 18 2136 Fri Apr 17, 2020 1533 Bedside ultrasound of the left second digit reveals no fluid collection visible.  In view of the bulging area at the laceration site patient is treated with Betadine with subsequent alcohol cleansing.  An 18-gauge needle was introduced at the area of the laceration with no aspirate or fluid at all obtained.    [HH]   1534 I discussed the findings today with the patient.  His creatinine is mildly elevated from his baseline and his sodium is mildly depressed.  Procalcitonin is 0.31 with a white count of 14.17, which is mildly increased from previous.  He has no cough congestion or URI symptoms.  He denies any shortness of breath.    [HH]   1544 Patient is serially reevaluated throughout the ED course.  He remains hemodynamically stableHave discussed the findings with Dr. Salguero, nephrology.  I discussed the lack of any abdominal discomfort and with clear fluids with a temp of 99.1 along with the CRP white count and procalcitonin findings today.  I discussed my inclination to treat him with antibiotics.  He would suggest a dose of vancomycin 1200 mg and gentamicin 80 mg and will follow up with the PD clinic for his peritoneal dialysis.  We discussed Bactrim and Keflex on discharge 2.    [HH]   1550 I discussed the findings in the ED with Dr. Holley, the patient's primary physician.  I discussed my inclination to treat him with IV antibiotics, and he concurs and will follow patient up on Monday.  I discussed treatment with Bactrim and Keflex.  He is available over the weekend if the patient has a decompensationVancomycin and gentamicin ordered IV    [HH]   Sat Apr 18, 2020 2135 Patient was discharged yesterday after IV antibiotics.  I discussed findings with both nephrology and his primary care physician Dr. Marley  ray. called me this morning to inform you that the patient was going to come to the ED with increasing weakness.  He did not come to the ED today and I was unable to contact him as his contact information provided to the ED yesterday was incorrect.  I contacted  back to inform him that the patient had not shown up to the ED again.  He said he would call the patient, assess his situation and again asked him to come to the ED if he is not improved.    [HH]      ED Course User Index  [HH] Oswaldo Shaver MD     No results found for this or any previous visit (from the past 24 hour(s)).  Note: In addition to lab results from this visit, the labs listed above may include labs taken at another facility or during a different encounter within the last 24 hours. Please correlate lab times with ED admission and discharge times for further clarification of the services performed during this visit.    XR Chest AP   Final Result   No evidence of active chest disease. Probable apparatus   shadow superimposed over the left upper quadrant.        DICTATED:   04/17/2020   EDITED/ls :   04/17/2020        This report was finalized on 4/17/2020 10:38 PM by Dr. Tristan Valerio MD.          XR Finger 2+ View Left   Final Result   Soft tissue swelling of the radial distal aspect of the   second digit. No visible foreign body. No evidence of acute bony trauma.   Incidentally noted extensive arterial calcification.       DICTATED:   04/17/2020   EDITED/ls :   04/17/2020             This report was finalized on 4/17/2020 10:38 PM by Dr. Tristan Valerio MD.            Vitals:    04/17/20 1730 04/17/20 1800 04/17/20 1830 04/17/20 1843   BP: 148/83 147/82 149/86    BP Location:       Patient Position:       Pulse:       Resp:       Temp:       TempSrc:       SpO2: 92% 99%  99%   Weight:       Height:         Medications   lidocaine-epinephrine-tetracaine (LET) topical gel 3 mL (3 mL Topical Given 4/17/20 1449)   vancomycin 1250  mg/250 mL 0.9% NS IVPB ([facility]) (0 mg Intravenous Stopped 4/17/20 1900)   gentamicin (GARAMYCIN) IVPB 80 mg (0 mg Intravenous Stopped 4/17/20 1644)     ECG/EMG Results (last 24 hours)     ** No results found for the last 24 hours. **        No orders to display                                              MDM    Final diagnoses:   Febrile illness   Finger infection   Leukocytosis, unspecified type   Elevated C-reactive protein (CRP)   Chronic renal failure, stage 5 (CMS/HCC)   Exposure to SARS-associated coronavirus       Documentation assistance provided by [facility].  Information recorded by the [facility] was done at my direction and has been verified and validated by me.     Vernon Guido  04/17/20 3214       Oswaldo Shaver MD  04/17/20 3334       Oswaldo Shaver MD  04/18/20 3546

## 2020-04-18 LAB
REF LAB TEST METHOD: NORMAL
SARS-COV-2 RNA RESP QL NAA+PROBE: NOT DETECTED

## 2020-04-18 NOTE — PROGRESS NOTES
COVID-19 Test Result   Telephone Encounter    Patient Name: Jaswant Henson  : 1955  MRN: [id number]    COVID19   Date Value Ref Range Status   2020 Not Detected Not Detected Final        Patient was counseled as follows:  • (-) negative COVID-19 test result  • The test is not perfect, so there is a chance it could be falsely negative or the virus level for too low for detection due to being very early in their infectious process.    • For this reason, Jaswant Henson strongly encouraged to practice the safest standards in protecting their health and others given the endemic concerns.  Patient was advised to:  o Practice social distancing in the community  o Continue to wash their hands frequently with soap and hot water, and cover their cough.   • If patient develops non-emergent symptoms such as mild increased shortness of breath, fever, cough, or for other questions, Jaswant Henson was asked to please call their primary care physician’s office or the [employer] [facility] at [phone].  • Questions were engaged and answered to the best of my ability, patient expressed verbal understanding of their test results and my advice.      Primary Care Physician verified as being: Donell Holley MD        Electronically signed by MAVIS Bernstein, 20, 9:20 AM.

## 2020-04-20 ENCOUNTER — TRANSCRIBE ORDERS (OUTPATIENT)
Dept: ADMINISTRATIVE | Facility: HOSPITAL | Age: 65
End: 2020-04-20

## 2020-04-20 ENCOUNTER — EPISODE CHANGES (OUTPATIENT)
Dept: CASE MANAGEMENT | Facility: OTHER | Age: 65
End: 2020-04-20

## 2020-04-20 DIAGNOSIS — M86.142 OSTEOMYELITIS OF HAND, LEFT, ACUTE (HCC): Primary | ICD-10-CM

## 2020-04-21 ENCOUNTER — HOSPITAL ENCOUNTER (OUTPATIENT)
Dept: MRI IMAGING | Facility: HOSPITAL | Age: 65
Discharge: HOME | End: 2020-04-21

## 2020-04-21 DIAGNOSIS — M86.142 OSTEOMYELITIS OF HAND, LEFT, ACUTE (HCC): ICD-10-CM

## 2020-04-22 LAB
BACTERIA SPEC AEROBE CULT: NORMAL
BACTERIA SPEC AEROBE CULT: NORMAL

## 2020-05-03 ENCOUNTER — EPISODE CHANGES (OUTPATIENT)
Dept: CASE MANAGEMENT | Facility: OTHER | Age: 65
End: 2020-05-03

## 2020-08-28 ENCOUNTER — TRANSCRIBE ORDERS (OUTPATIENT)
Dept: LAB | Facility: HOSPITAL | Age: 65
End: 2020-08-28

## 2020-08-28 ENCOUNTER — LAB (OUTPATIENT)
Dept: LAB | Facility: HOSPITAL | Age: 65
End: 2020-08-28

## 2020-08-28 DIAGNOSIS — E13.638 OTHER SPECIFIED DIABETES MELLITUS WITH OTHER ORAL COMPLICATION, UNSPECIFIED WHETHER LONG TERM INSULIN USE (HCC): ICD-10-CM

## 2020-08-28 DIAGNOSIS — I51.9 MYXEDEMA HEART DISEASE: ICD-10-CM

## 2020-08-28 DIAGNOSIS — E03.9 MYXEDEMA HEART DISEASE: Primary | ICD-10-CM

## 2020-08-28 DIAGNOSIS — E03.9 MYXEDEMA HEART DISEASE: ICD-10-CM

## 2020-08-28 DIAGNOSIS — I51.9 MYXEDEMA HEART DISEASE: Primary | ICD-10-CM

## 2020-08-28 DIAGNOSIS — N18.9 CHRONIC KIDNEY DISEASE, UNSPECIFIED CKD STAGE: ICD-10-CM

## 2020-08-28 DIAGNOSIS — D64.9 ANEMIA, UNSPECIFIED TYPE: ICD-10-CM

## 2020-08-28 LAB
ALBUMIN SERPL-MCNC: 3.8 G/DL (ref 3.5–5.2)
ALBUMIN/GLOB SERPL: 1.4 G/DL
ALP SERPL-CCNC: 124 U/L (ref 39–117)
ALT SERPL W P-5'-P-CCNC: 39 U/L (ref 1–41)
ANION GAP SERPL CALCULATED.3IONS-SCNC: 13.5 MMOL/L (ref 5–15)
AST SERPL-CCNC: 23 U/L (ref 1–40)
BASOPHILS # BLD AUTO: 0.04 10*3/MM3 (ref 0–0.2)
BASOPHILS NFR BLD AUTO: 0.4 % (ref 0–1.5)
BILIRUB SERPL-MCNC: 0.3 MG/DL (ref 0–1.2)
BUN SERPL-MCNC: 33 MG/DL (ref 8–23)
BUN/CREAT SERPL: 5.1 (ref 7–25)
CALCIUM SPEC-SCNC: 9.5 MG/DL (ref 8.6–10.5)
CHLORIDE SERPL-SCNC: 101 MMOL/L (ref 98–107)
CO2 SERPL-SCNC: 23.5 MMOL/L (ref 22–29)
CREAT SERPL-MCNC: 6.41 MG/DL (ref 0.76–1.27)
CRP SERPL-MCNC: 0.21 MG/DL (ref 0–0.5)
DEPRECATED RDW RBC AUTO: 42.2 FL (ref 37–54)
EOSINOPHIL # BLD AUTO: 0.28 10*3/MM3 (ref 0–0.4)
EOSINOPHIL NFR BLD AUTO: 2.8 % (ref 0.3–6.2)
ERYTHROCYTE [DISTWIDTH] IN BLOOD BY AUTOMATED COUNT: 13 % (ref 12.3–15.4)
ERYTHROCYTE [SEDIMENTATION RATE] IN BLOOD: 17 MM/HR (ref 0–20)
FERRITIN SERPL-MCNC: 353 NG/ML (ref 30–400)
GFR SERPL CREATININE-BSD FRML MDRD: 9 ML/MIN/1.73
GFR SERPL CREATININE-BSD FRML MDRD: ABNORMAL ML/MIN/{1.73_M2}
GLOBULIN UR ELPH-MCNC: 2.8 GM/DL
GLUCOSE SERPL-MCNC: 96 MG/DL (ref 65–99)
HBA1C MFR BLD: 6.6 % (ref 4.8–5.6)
HCT VFR BLD AUTO: 37.3 % (ref 37.5–51)
HGB BLD-MCNC: 13 G/DL (ref 13–17.7)
IMM GRANULOCYTES # BLD AUTO: 0.23 10*3/MM3 (ref 0–0.05)
IMM GRANULOCYTES NFR BLD AUTO: 2.3 % (ref 0–0.5)
LYMPHOCYTES # BLD AUTO: 3.28 10*3/MM3 (ref 0.7–3.1)
LYMPHOCYTES NFR BLD AUTO: 33 % (ref 19.6–45.3)
MCH RBC QN AUTO: 31.6 PG (ref 26.6–33)
MCHC RBC AUTO-ENTMCNC: 34.9 G/DL (ref 31.5–35.7)
MCV RBC AUTO: 90.5 FL (ref 79–97)
MONOCYTES # BLD AUTO: 1.04 10*3/MM3 (ref 0.1–0.9)
MONOCYTES NFR BLD AUTO: 10.5 % (ref 5–12)
NEUTROPHILS NFR BLD AUTO: 5.08 10*3/MM3 (ref 1.7–7)
NEUTROPHILS NFR BLD AUTO: 51 % (ref 42.7–76)
NRBC BLD AUTO-RTO: 0 /100 WBC (ref 0–0.2)
PHOSPHATE SERPL-MCNC: 4.8 MG/DL (ref 2.5–4.5)
PLATELET # BLD AUTO: 290 10*3/MM3 (ref 140–450)
PMV BLD AUTO: 10 FL (ref 6–12)
POTASSIUM SERPL-SCNC: 4 MMOL/L (ref 3.5–5.2)
PROT SERPL-MCNC: 6.6 G/DL (ref 6–8.5)
PTH-INTACT SERPL-MCNC: 451 PG/ML (ref 15–65)
RBC # BLD AUTO: 4.12 10*6/MM3 (ref 4.14–5.8)
SODIUM SERPL-SCNC: 138 MMOL/L (ref 136–145)
T3FREE SERPL-MCNC: 2.62 PG/ML (ref 2–4.4)
T4 FREE SERPL-MCNC: 1.32 NG/DL (ref 0.93–1.7)
TSH SERPL DL<=0.05 MIU/L-ACNC: 0.63 UIU/ML (ref 0.27–4.2)
URATE SERPL-MCNC: 4.8 MG/DL (ref 3.4–7)
WBC # BLD AUTO: 9.95 10*3/MM3 (ref 3.4–10.8)

## 2020-08-28 PROCEDURE — 85652 RBC SED RATE AUTOMATED: CPT

## 2020-08-28 PROCEDURE — 83036 HEMOGLOBIN GLYCOSYLATED A1C: CPT

## 2020-08-28 PROCEDURE — 82728 ASSAY OF FERRITIN: CPT

## 2020-08-28 PROCEDURE — 84439 ASSAY OF FREE THYROXINE: CPT

## 2020-08-28 PROCEDURE — 36415 COLL VENOUS BLD VENIPUNCTURE: CPT

## 2020-08-28 PROCEDURE — 84481 FREE ASSAY (FT-3): CPT

## 2020-08-28 PROCEDURE — 82595 ASSAY OF CRYOGLOBULIN: CPT

## 2020-08-28 PROCEDURE — 86140 C-REACTIVE PROTEIN: CPT

## 2020-08-28 PROCEDURE — 80053 COMPREHEN METABOLIC PANEL: CPT

## 2020-08-28 PROCEDURE — 85025 COMPLETE CBC W/AUTO DIFF WBC: CPT

## 2020-08-28 PROCEDURE — 84443 ASSAY THYROID STIM HORMONE: CPT

## 2020-08-28 PROCEDURE — 84550 ASSAY OF BLOOD/URIC ACID: CPT

## 2020-08-28 PROCEDURE — 83970 ASSAY OF PARATHORMONE: CPT

## 2020-08-28 PROCEDURE — 84100 ASSAY OF PHOSPHORUS: CPT

## 2020-09-03 LAB — CRYOGLOB SER QL 1D COLD INC: NORMAL

## 2020-10-27 ENCOUNTER — OFFICE VISIT (OUTPATIENT)
Dept: ENDOCRINOLOGY | Facility: CLINIC | Age: 65
End: 2020-10-27

## 2020-10-27 VITALS
SYSTOLIC BLOOD PRESSURE: 122 MMHG | HEIGHT: 76 IN | BODY MASS INDEX: 26.3 KG/M2 | DIASTOLIC BLOOD PRESSURE: 70 MMHG | TEMPERATURE: 98 F | WEIGHT: 216 LBS | OXYGEN SATURATION: 97 % | HEART RATE: 80 BPM | RESPIRATION RATE: 18 BRPM

## 2020-10-27 DIAGNOSIS — E11.21 TYPE 2 DIABETES MELLITUS WITH DIABETIC NEPHROPATHY, WITH LONG-TERM CURRENT USE OF INSULIN (HCC): ICD-10-CM

## 2020-10-27 DIAGNOSIS — Z79.4 TYPE 2 DIABETES MELLITUS WITH DIABETIC NEUROPATHY, WITH LONG-TERM CURRENT USE OF INSULIN (HCC): Primary | ICD-10-CM

## 2020-10-27 DIAGNOSIS — Z79.4 TYPE 2 DIABETES MELLITUS WITH DIABETIC NEPHROPATHY, WITH LONG-TERM CURRENT USE OF INSULIN (HCC): ICD-10-CM

## 2020-10-27 DIAGNOSIS — E11.40 TYPE 2 DIABETES MELLITUS WITH DIABETIC NEUROPATHY, WITH LONG-TERM CURRENT USE OF INSULIN (HCC): Primary | ICD-10-CM

## 2020-10-27 PROCEDURE — 99213 OFFICE O/P EST LOW 20 MIN: CPT | Performed by: PHYSICIAN ASSISTANT

## 2020-10-27 RX ORDER — PREDNISONE 1 MG/1
5 TABLET ORAL DAILY
COMMUNITY

## 2020-10-27 RX ORDER — LEDIPASVIR AND SOFOSBUVIR 90; 400 MG/1; MG/1
1 TABLET, FILM COATED ORAL DAILY
COMMUNITY
End: 2022-02-07

## 2020-10-27 RX ORDER — GABAPENTIN 300 MG/1
300 CAPSULE ORAL DAILY
COMMUNITY
Start: 2020-08-21

## 2020-10-27 RX ORDER — OMEPRAZOLE 20 MG/1
20 CAPSULE, DELAYED RELEASE ORAL DAILY
COMMUNITY
Start: 2020-09-29 | End: 2022-02-07

## 2020-10-27 RX ORDER — HYDROXYCHLOROQUINE SULFATE 200 MG/1
200 TABLET, FILM COATED ORAL DAILY
COMMUNITY

## 2020-10-27 NOTE — PROGRESS NOTES
"     Office Note      Date: 10/27/2020  Patient Name: Jaswant Henson  MRN: [id number]  : 1955    Chief Complaint   Patient presents with   • Diabetes       History of Present Illness:   Jaswant Henson is a 65 y.o. male who presents for type 2 diabetes.  He reports he has been off the Humalog for the last few months.  He is only taking 5 mg of prednisone and he was getting some low blood sugars when taking the Humalog.  He continues to take the Lantus 70 units daily.  He is taking Januvia 25 mg daily.  He denies any trouble with hypoglycemia  He continues on peritoneal dialysis.  He is currently getting treatment for hepatitis C.  He is hoping if the hepatitis C is treated he will be able to get back on the kidney transplant list at .  He reports he has not been checking his blood sugars as regular recently.  Is having some trouble with sores healing on his hands.  He had to have surgery for a wound on his left finger in April of this year.  Everything is healing okay now.  He reports he is due for his eye exam this was canceled during the pandemic earlier this year.  He will try to get this rescheduled.  He had his flu vaccine this fall.  He denies any problems with his feet.      Subjective     Review of Systems:   Review of Systems   Constitutional: Negative.    Cardiovascular: Negative.    Gastrointestinal: Negative.    Endocrine: Negative.    Genitourinary: Negative.    Neurological: Negative.        The following portions of the patient's history were reviewed and updated as appropriate: allergies, current medications, past family history, past medical history, past social history, past surgical history and problem list.    Objective     Vitals:    10/27/20 1510   BP: 122/70   Pulse: 80   Resp: 18   Temp: 98 °F (36.7 °C)   SpO2: 97%   Weight: 98 kg (216 lb)   Height: 193 cm (75.98\")     Body mass index is 26.3 kg/m².    Physical Exam  Vitals signs reviewed.   Constitutional:       General: He " is not in acute distress.     Appearance: Normal appearance.   Cardiovascular:      Pulses:           Dorsalis pedis pulses are 1+ on the right side and 1+ on the left side.   Musculoskeletal:      Right foot: No deformity.      Left foot: No deformity.   Feet:      Right foot:      Protective Sensation: 10 sites tested. 0 sites sensed.      Skin integrity: Callus and dry skin present.      Left foot:      Protective Sensation: 10 sites tested. 0 sites sensed.      Skin integrity: Callus and dry skin present.      Comments: Diabetic Foot Exam Performed and Monofilament Test Performed    Neurological:      Mental Status: He is alert.         HEMOGLOBIN A1C  Lab Results   Component Value Date    HGBA1C 6.60 (H) 08/28/2020         Assessment / Plan      Assessment & Plan:  1. Type 2 diabetes mellitus with diabetic neuropathy, with long-term current use of insulin (CMS/[facility])  A1c today 6.6%.  Reviewed results with patient.  We will continue his current insulin regimen.  Encourage patient to check blood sugars more regularly and send in readings for review and adjustment.  Discussed monitoring for hypoglycemia and reducing insulin if this occurs.  Blood pressure okay today  Weight down 12 pounds since his appointment in February.  Foot exam today showed neuropathy with callus formation but nothing else concerning.    2. Type 2 diabetes mellitus with diabetic nephropathy, with long-term current use of insulin (CMS/[facility])  Patient sees nephrologist regularly.  Patient is currently on peritoneal dialysis regularly.  A1c 6.6% today he will continue his current diabetes regimen.  Patient to call as needed.      Return in about 6 months (around 4/27/2021) for Recheck.     Jenn Doan PA-C  10/27/2020

## 2021-03-11 NOTE — TELEPHONE ENCOUNTER
Please call in for pt his lantus pens to [facility] [address]    pts number if you have questions [phone]

## 2021-04-20 RX ORDER — SITAGLIPTIN 25 MG/1
TABLET, FILM COATED ORAL
Qty: 90 TABLET | Refills: 0 | Status: SHIPPED | OUTPATIENT
Start: 2021-04-20 | End: 2021-05-03

## 2021-05-03 ENCOUNTER — OFFICE VISIT (OUTPATIENT)
Dept: ENDOCRINOLOGY | Facility: CLINIC | Age: 66
End: 2021-05-03

## 2021-05-03 VITALS
HEART RATE: 91 BPM | TEMPERATURE: 98.2 F | OXYGEN SATURATION: 97 % | DIASTOLIC BLOOD PRESSURE: 70 MMHG | BODY MASS INDEX: 26.52 KG/M2 | HEIGHT: 76 IN | SYSTOLIC BLOOD PRESSURE: 118 MMHG | WEIGHT: 217.8 LBS

## 2021-05-03 DIAGNOSIS — E11.40 TYPE 2 DIABETES MELLITUS WITH DIABETIC NEUROPATHY, WITH LONG-TERM CURRENT USE OF INSULIN (HCC): Primary | ICD-10-CM

## 2021-05-03 DIAGNOSIS — Z79.4 TYPE 2 DIABETES MELLITUS WITH DIABETIC NEUROPATHY, WITH LONG-TERM CURRENT USE OF INSULIN (HCC): Primary | ICD-10-CM

## 2021-05-03 LAB
HBA1C MFR BLD: 6.3 %
Lab: NORMAL
Lab: NORMAL

## 2021-05-03 PROCEDURE — 99213 OFFICE O/P EST LOW 20 MIN: CPT | Performed by: PHYSICIAN ASSISTANT

## 2021-05-03 PROCEDURE — 83036 HEMOGLOBIN GLYCOSYLATED A1C: CPT | Performed by: PHYSICIAN ASSISTANT

## 2021-05-03 RX ORDER — ALLOPURINOL 300 MG/1
TABLET ORAL DAILY
COMMUNITY
Start: 2021-02-05

## 2021-05-03 RX ORDER — SEVELAMER CARBONATE 800 MG/1
TABLET, FILM COATED ORAL DAILY
COMMUNITY
Start: 2021-04-13 | End: 2022-02-07

## 2021-05-03 RX ORDER — CALCITRIOL 0.25 UG/1
0.25 CAPSULE, LIQUID FILLED ORAL DAILY
COMMUNITY
Start: 2021-04-14 | End: 2022-02-07

## 2021-05-03 RX ORDER — TAMSULOSIN HYDROCHLORIDE 0.4 MG/1
CAPSULE ORAL DAILY
COMMUNITY
Start: 2021-02-09

## 2021-05-03 RX ORDER — LEVOTHYROXINE SODIUM 175 UG/1
TABLET ORAL DAILY
COMMUNITY
Start: 2021-04-24

## 2021-05-03 NOTE — PROGRESS NOTES
"     Office Note      Date: 2021  Patient Name: Jaswant Henson  MRN: [id number]  : 1955    Chief Complaint   Patient presents with   • Diabetes       History of Present Illness:   Jaswant Henson is a 66 y.o. male who presents for type 2 diabetes.  He remains on Lantus 70 units daily and has been taking Januvia 25 mg daily but is going to have to stop this due to cost.  He is not checking his blood sugars regularly but denies any symptoms of hyper or hypoglycemia.  He continues on peritoneal dialysis.  He had treatment for hepatitis C and was diagnosed with liver cancer early this year he had to have an ablation they plan for rescan but he did not schedule this.  He reports he is up-to-date on his eye exam.  He had both doses of the Covid vaccine.  He denies any trouble with his feet today other than numbness from neuropathy.  We did his foot exam at his appointment in October.  He reports they have had trouble keeping his blood pressure up.  His amlodipine was stopped and they have adjusted his peritoneal dialysate.      Subjective     Review of Systems:   Review of Systems   Constitutional: Negative.    Cardiovascular: Negative.    Gastrointestinal: Negative.    Endocrine: Negative.    Neurological: Negative.        The following portions of the patient's history were reviewed and updated as appropriate: allergies, current medications, past family history, past medical history, past social history, past surgical history and problem list.    Objective     Vitals:    21 1339   BP: 118/70   Pulse: 91   Temp: 98.2 °F (36.8 °C)   TempSrc: Infrared   SpO2: 97%   Weight: 98.8 kg (217 lb 12.8 oz)   Height: 193 cm (76\")     Body mass index is 26.51 kg/m².    Physical Exam  Vitals reviewed.   Constitutional:       General: He is not in acute distress.     Appearance: Normal appearance.   Neurological:      Mental Status: He is alert.         HEMOGLOBIN A1C  Lab Results   Component Value Date    " HGBA1C 6.3 05/03/2021           Assessment / Plan      Assessment & Plan:  1. Type 2 diabetes mellitus with diabetic neuropathy, with long-term current use of insulin (CMS/[facility])  His A1c is 6.3% today.  We will continue his Lantus 70 units daily for now.  He will stop Januvia due to cost and contact me with blood sugars he has any trouble.  I sampled a pen of Lantus today he will also contact me if the Lantus cost is too high he reports he felt this was more affordable in the past.  Blood pressure okay today.  His weight is stable.  Patient to call as needed  - POC Glycosylated Hemoglobin (Hb A1C)      Return in about 6 months (around 11/3/2021) for Recheck.     Jenn Doan PA-C  05/03/2021

## 2021-09-02 RX ORDER — PEN NEEDLE, DIABETIC 32GX 5/32"
NEEDLE, DISPOSABLE MISCELLANEOUS
Qty: 400 EACH | Refills: 1 | Status: SHIPPED | OUTPATIENT
Start: 2021-09-02 | End: 2022-12-05

## 2021-09-08 ENCOUNTER — LAB (OUTPATIENT)
Dept: LAB | Facility: HOSPITAL | Age: 66
End: 2021-09-08

## 2021-09-08 ENCOUNTER — TRANSCRIBE ORDERS (OUTPATIENT)
Dept: LAB | Facility: HOSPITAL | Age: 66
End: 2021-09-08

## 2021-09-08 DIAGNOSIS — E78.5 HYPERLIPIDEMIA, UNSPECIFIED HYPERLIPIDEMIA TYPE: ICD-10-CM

## 2021-09-08 DIAGNOSIS — E03.9 HYPOTHYROIDISM, UNSPECIFIED TYPE: ICD-10-CM

## 2021-09-08 DIAGNOSIS — E78.5 HYPERLIPIDEMIA, UNSPECIFIED HYPERLIPIDEMIA TYPE: Primary | ICD-10-CM

## 2021-09-08 DIAGNOSIS — E55.9 AVITAMINOSIS D: ICD-10-CM

## 2021-09-08 DIAGNOSIS — N40.1 ENLARGED PROSTATE WITH URINARY OBSTRUCTION: ICD-10-CM

## 2021-09-08 DIAGNOSIS — E11.9 DIABETES MELLITUS, STABLE (HCC): ICD-10-CM

## 2021-09-08 DIAGNOSIS — M25.50 PAIN IN JOINT, MULTIPLE SITES: ICD-10-CM

## 2021-09-08 DIAGNOSIS — N13.8 ENLARGED PROSTATE WITH URINARY OBSTRUCTION: ICD-10-CM

## 2021-09-08 LAB
25(OH)D3 SERPL-MCNC: 36.4 NG/ML
ALBUMIN SERPL-MCNC: 4.1 G/DL (ref 3.5–5.2)
ALBUMIN/GLOB SERPL: 1.6 G/DL
ALP SERPL-CCNC: 94 U/L (ref 39–117)
ALT SERPL W P-5'-P-CCNC: 21 U/L (ref 1–41)
ANION GAP SERPL CALCULATED.3IONS-SCNC: 15.2 MMOL/L (ref 5–15)
AST SERPL-CCNC: 16 U/L (ref 1–40)
BILIRUB SERPL-MCNC: 0.3 MG/DL (ref 0–1.2)
BUN SERPL-MCNC: 39 MG/DL (ref 8–23)
BUN/CREAT SERPL: 4.3 (ref 7–25)
CALCIUM SPEC-SCNC: 9.8 MG/DL (ref 8.6–10.5)
CHLORIDE SERPL-SCNC: 97 MMOL/L (ref 98–107)
CHOLEST SERPL-MCNC: 147 MG/DL (ref 0–200)
CO2 SERPL-SCNC: 24.8 MMOL/L (ref 22–29)
CREAT SERPL-MCNC: 9.08 MG/DL (ref 0.76–1.27)
GFR SERPL CREATININE-BSD FRML MDRD: 6 ML/MIN/1.73
GFR SERPL CREATININE-BSD FRML MDRD: ABNORMAL ML/MIN/{1.73_M2}
GLOBULIN UR ELPH-MCNC: 2.6 GM/DL
GLUCOSE SERPL-MCNC: 69 MG/DL (ref 65–99)
HBA1C MFR BLD: 7.41 % (ref 4.8–5.6)
HDLC SERPL-MCNC: 24 MG/DL (ref 40–60)
LDLC SERPL CALC-MCNC: 89 MG/DL (ref 0–100)
LDLC/HDLC SERPL: 3.46 {RATIO}
PHOSPHATE SERPL-MCNC: 6.4 MG/DL (ref 2.5–4.5)
POTASSIUM SERPL-SCNC: 3.8 MMOL/L (ref 3.5–5.2)
PROT SERPL-MCNC: 6.7 G/DL (ref 6–8.5)
PSA SERPL-MCNC: 0.81 NG/ML (ref 0–4)
PTH-INTACT SERPL-MCNC: 489 PG/ML (ref 15–65)
SODIUM SERPL-SCNC: 137 MMOL/L (ref 136–145)
T3FREE SERPL-MCNC: 2.81 PG/ML (ref 2–4.4)
T4 FREE SERPL-MCNC: 1.18 NG/DL (ref 0.93–1.7)
TRIGL SERPL-MCNC: 200 MG/DL (ref 0–150)
TSH SERPL DL<=0.05 MIU/L-ACNC: 0.73 UIU/ML (ref 0.27–4.2)
VLDLC SERPL-MCNC: 34 MG/DL (ref 5–40)

## 2021-09-08 PROCEDURE — 84481 FREE ASSAY (FT-3): CPT

## 2021-09-08 PROCEDURE — 82306 VITAMIN D 25 HYDROXY: CPT

## 2021-09-08 PROCEDURE — 83036 HEMOGLOBIN GLYCOSYLATED A1C: CPT

## 2021-09-08 PROCEDURE — 84443 ASSAY THYROID STIM HORMONE: CPT

## 2021-09-08 PROCEDURE — 83970 ASSAY OF PARATHORMONE: CPT

## 2021-09-08 PROCEDURE — 36415 COLL VENOUS BLD VENIPUNCTURE: CPT | Performed by: FAMILY MEDICINE

## 2021-09-08 PROCEDURE — 84100 ASSAY OF PHOSPHORUS: CPT

## 2021-09-08 PROCEDURE — 80053 COMPREHEN METABOLIC PANEL: CPT | Performed by: FAMILY MEDICINE

## 2021-09-08 PROCEDURE — 84153 ASSAY OF PSA TOTAL: CPT

## 2021-09-08 PROCEDURE — 84439 ASSAY OF FREE THYROXINE: CPT

## 2021-09-08 PROCEDURE — 80061 LIPID PANEL: CPT

## 2021-09-08 PROCEDURE — 86235 NUCLEAR ANTIGEN ANTIBODY: CPT

## 2021-09-08 PROCEDURE — 82652 VIT D 1 25-DIHYDROXY: CPT

## 2021-09-09 LAB — ENA SCL70 AB SER-ACNC: <0.2 AI (ref 0–0.9)

## 2021-09-10 LAB — 1,25(OH)2D SERPL-MCNC: 11.8 PG/ML (ref 19.9–79.3)

## 2021-11-04 ENCOUNTER — TELEPHONE (OUTPATIENT)
Dept: ENDOCRINOLOGY | Facility: CLINIC | Age: 66
End: 2021-11-04

## 2021-11-04 ENCOUNTER — OFFICE VISIT (OUTPATIENT)
Dept: ENDOCRINOLOGY | Facility: CLINIC | Age: 66
End: 2021-11-04

## 2021-11-04 DIAGNOSIS — Z79.4 TYPE 2 DIABETES MELLITUS WITH HYPERGLYCEMIA, WITH LONG-TERM CURRENT USE OF INSULIN (HCC): Primary | ICD-10-CM

## 2021-11-04 DIAGNOSIS — E11.65 TYPE 2 DIABETES MELLITUS WITH HYPERGLYCEMIA, WITH LONG-TERM CURRENT USE OF INSULIN (HCC): Primary | ICD-10-CM

## 2021-11-04 NOTE — TELEPHONE ENCOUNTER
PATIENT DID NOT WANT TO WAIT TO BE SEEN BY DR REDDY. APPT CANCELLED. STATES THAT HE DOESN'T CARE IF HE'S EVER SEEN AGAIN, ONLY WANTS HIS MEDICATION. NEW APPT SCHEDULED WITH TO JASSO IN FEB. ADVISED PATIENT TO CALL OFFICE WHEN HE IS DUE FOR REFILL.

## 2021-11-05 PROBLEM — Z79.4 TYPE 2 DIABETES MELLITUS, WITH LONG-TERM CURRENT USE OF INSULIN: Status: ACTIVE | Noted: 2021-11-05

## 2021-11-05 PROBLEM — E11.9 TYPE 2 DIABETES MELLITUS, WITH LONG-TERM CURRENT USE OF INSULIN (HCC): Status: ACTIVE | Noted: 2021-11-05

## 2021-11-30 RX ORDER — INSULIN GLARGINE 100 [IU]/ML
INJECTION, SOLUTION SUBCUTANEOUS
Qty: 25 PEN | Refills: 1 | Status: SHIPPED | OUTPATIENT
Start: 2021-11-30 | End: 2022-02-07 | Stop reason: SDUPTHER

## 2021-11-30 NOTE — TELEPHONE ENCOUNTER
PT CALLED TO MAKE SURE WE CALL IN HIS LANTUS PENS TODAY IF POSSIBLE TO [address]    PTS NUMBER IF YOU HAVE QUESTIONS  [phone]

## 2022-02-07 ENCOUNTER — OFFICE VISIT (OUTPATIENT)
Dept: ENDOCRINOLOGY | Facility: CLINIC | Age: 67
End: 2022-02-07

## 2022-02-07 VITALS
OXYGEN SATURATION: 99 % | HEART RATE: 78 BPM | HEIGHT: 76 IN | WEIGHT: 222 LBS | SYSTOLIC BLOOD PRESSURE: 138 MMHG | BODY MASS INDEX: 27.03 KG/M2 | DIASTOLIC BLOOD PRESSURE: 74 MMHG

## 2022-02-07 DIAGNOSIS — E11.42 TYPE 2 DIABETES MELLITUS WITH DIABETIC POLYNEUROPATHY, WITH LONG-TERM CURRENT USE OF INSULIN: ICD-10-CM

## 2022-02-07 DIAGNOSIS — Z79.4 TYPE 2 DIABETES MELLITUS WITH DIABETIC POLYNEUROPATHY, WITH LONG-TERM CURRENT USE OF INSULIN: ICD-10-CM

## 2022-02-07 DIAGNOSIS — E11.65 TYPE 2 DIABETES MELLITUS WITH HYPERGLYCEMIA, WITH LONG-TERM CURRENT USE OF INSULIN: Primary | ICD-10-CM

## 2022-02-07 DIAGNOSIS — Z79.4 TYPE 2 DIABETES MELLITUS WITH HYPERGLYCEMIA, WITH LONG-TERM CURRENT USE OF INSULIN: Primary | ICD-10-CM

## 2022-02-07 LAB
EXPIRATION DATE: ABNORMAL
EXPIRATION DATE: NORMAL
GLUCOSE BLDC GLUCOMTR-MCNC: 195 MG/DL (ref 70–130)
HBA1C MFR BLD: 7.7 %
Lab: ABNORMAL
Lab: NORMAL

## 2022-02-07 PROCEDURE — 82947 ASSAY GLUCOSE BLOOD QUANT: CPT | Performed by: PHYSICIAN ASSISTANT

## 2022-02-07 PROCEDURE — 83036 HEMOGLOBIN GLYCOSYLATED A1C: CPT | Performed by: PHYSICIAN ASSISTANT

## 2022-02-07 PROCEDURE — 99213 OFFICE O/P EST LOW 20 MIN: CPT | Performed by: PHYSICIAN ASSISTANT

## 2022-02-07 PROCEDURE — 3051F HG A1C>EQUAL 7.0%<8.0%: CPT | Performed by: PHYSICIAN ASSISTANT

## 2022-02-07 RX ORDER — FUROSEMIDE 80 MG
TABLET ORAL DAILY
COMMUNITY
Start: 2021-12-23

## 2022-02-07 RX ORDER — METOPROLOL SUCCINATE 25 MG/1
TABLET, EXTENDED RELEASE ORAL
COMMUNITY

## 2022-02-07 RX ORDER — INSULIN GLARGINE 100 [IU]/ML
INJECTION, SOLUTION SUBCUTANEOUS
Qty: 25 PEN | Refills: 1 | Status: SHIPPED | OUTPATIENT
Start: 2022-02-07 | End: 2022-05-09

## 2022-02-07 RX ORDER — LANSOPRAZOLE 30 MG/1
CAPSULE, DELAYED RELEASE ORAL DAILY
COMMUNITY
Start: 2021-12-11

## 2022-02-07 NOTE — PROGRESS NOTES
"     Office Note      Date: 2022  Patient Name: Jaswant Henosn  MRN: [id number]  : 1955    Chief Complaint   Patient presents with   • Diabetes       History of Present Illness:   Jaswant Henson is a 67 y.o. male who presents for follow-up for type 2 diabetes.  He remains on Lantus up to 80 units daily he typically takes around 70 units daily.  He reports overall his blood sugars have been okay.  He has had a few low blood sugars but has a snack and this is no major issue.  He denies any severe or frequent hypoglycemia.  He reports he is due for his eye exam.  He has had both doses of his COVID vaccine and his flu vaccine this fall.  He denies any trouble with his feet.  He remains on peritoneal dialysis.  He reports he was hospitalized with a bowel obstruction since his last appointment he was in the hospital for a few days but did not require any surgery.  He reports the nephrologist adjusted his medications and they are hopeful this will help.    Subjective     Review of Systems:   Review of Systems   Constitutional: Negative.    Cardiovascular: Negative.    Gastrointestinal: Negative.    Endocrine: Negative.    Neurological: Negative.        The following portions of the patient's history were reviewed and updated as appropriate: allergies, current medications, past family history, past medical history, past social history, past surgical history and problem list.    Objective     Vitals:    22 1540   BP: 138/74   Pulse: 78   SpO2: 99%   Weight: 101 kg (222 lb)   Height: 193 cm (76\")   PainSc: 0-No pain     Body mass index is 27.02 kg/m².    Physical Exam  Vitals reviewed.   Constitutional:       General: He is not in acute distress.     Appearance: Normal appearance.   Neurological:      Mental Status: He is alert.         HEMOGLOBIN A1C  Lab Results   Component Value Date    HGBA1C 7.7 2022       GLUCOSE  Glucose   Date Value Ref Range Status   2022 195 (A) 70 - 130 mg/dL " Final       CMP  Lab Results   Component Value Date    GLUCOSE 69 09/08/2021    BUN 39 (H) 09/08/2021    CREATININE 9.08 (H) 09/08/2021    EGFRIFNONA 6 (L) 09/08/2021    EGFRIFAFRI  09/08/2021      Comment:      <15 Indicative of kidney failure.    BCR 4.3 (L) 09/08/2021    K 3.8 09/08/2021    CO2 24.8 09/08/2021    CALCIUM 9.8 09/08/2021    PROTENTOTREF 6.9 09/29/2017    LABIL2 1.0 09/29/2017    AST 16 09/08/2021    ALT 21 09/08/2021       LIPID PANEL  Lab Results   Component Value Date    CHOL 147 09/08/2021    CHLPL 99 01/04/2016    TRIG 200 (H) 09/08/2021    HDL 24 (L) 09/08/2021    LDL 89 09/08/2021       URINE MICROALBUMIN/CREATININE RATIO  No results found for: MALBCRERATIO    TSH  Lab Results   Component Value Date    TSH 0.731 09/08/2021       Assessment / Plan      Assessment & Plan:  1. Type 2 diabetes mellitus with hyperglycemia, with long-term current use of insulin (HCC)  His A1c today is up some as compared to May at 7.7%.  This is acceptable.  His reported blood sugar readings have been okay.  He will continue his Lantus up to 80 units daily.  I encouraged him to send in blood sugars for review and adjustment as needed.  His weight is up 4 pounds since his appointment in May.  I encouraged healthy eating habits and physical activity as tolerated.  His blood pressure is okay today.  - POC Glycosylated Hemoglobin (Hb A1C)  - POC Glucose, Blood    2. Type 2 diabetes mellitus with diabetic polyneuropathy, with long-term current use of insulin (HCC)  His foot exam shows neuropathy.  We discussed the importance of looking at his feet regularly.  He reports his wife looks at them daily.  He will continue his current insulin regimen.  I refilled his Lantus today.  Patient to call as needed.      Return in about 6 months (around 8/7/2022) for Recheck.     This note was dictated using Dragon voice recognition.    Jenn Doan PA-C  02/07/2022

## 2022-05-09 RX ORDER — INSULIN GLARGINE 100 [IU]/ML
INJECTION, SOLUTION SUBCUTANEOUS
Qty: 30 ML | Refills: 5 | Status: SHIPPED | OUTPATIENT
Start: 2022-05-09 | End: 2022-11-29 | Stop reason: SDUPTHER

## 2022-08-08 ENCOUNTER — OFFICE VISIT (OUTPATIENT)
Dept: ENDOCRINOLOGY | Facility: CLINIC | Age: 67
End: 2022-08-08

## 2022-08-08 VITALS
HEART RATE: 77 BPM | DIASTOLIC BLOOD PRESSURE: 79 MMHG | HEIGHT: 76 IN | BODY MASS INDEX: 27.02 KG/M2 | OXYGEN SATURATION: 97 % | SYSTOLIC BLOOD PRESSURE: 126 MMHG

## 2022-08-08 DIAGNOSIS — Z79.4 TYPE 2 DIABETES MELLITUS WITH DIABETIC POLYNEUROPATHY, WITH LONG-TERM CURRENT USE OF INSULIN: Primary | ICD-10-CM

## 2022-08-08 DIAGNOSIS — E11.42 TYPE 2 DIABETES MELLITUS WITH DIABETIC POLYNEUROPATHY, WITH LONG-TERM CURRENT USE OF INSULIN: Primary | ICD-10-CM

## 2022-08-08 LAB
EXPIRATION DATE: ABNORMAL
EXPIRATION DATE: NORMAL
GLUCOSE BLDC GLUCOMTR-MCNC: 176 MG/DL (ref 70–130)
HBA1C MFR BLD: 7.5 %
Lab: ABNORMAL
Lab: NORMAL

## 2022-08-08 PROCEDURE — 83036 HEMOGLOBIN GLYCOSYLATED A1C: CPT | Performed by: PHYSICIAN ASSISTANT

## 2022-08-08 PROCEDURE — 99213 OFFICE O/P EST LOW 20 MIN: CPT | Performed by: PHYSICIAN ASSISTANT

## 2022-08-08 PROCEDURE — 82947 ASSAY GLUCOSE BLOOD QUANT: CPT | Performed by: PHYSICIAN ASSISTANT

## 2022-08-08 PROCEDURE — 3051F HG A1C>EQUAL 7.0%<8.0%: CPT | Performed by: PHYSICIAN ASSISTANT

## 2022-08-08 RX ORDER — AMLODIPINE BESYLATE 2.5 MG/1
TABLET ORAL
COMMUNITY
Start: 2022-07-19

## 2022-08-08 NOTE — PROGRESS NOTES
"     Office Note      Date: 2022  Patient Name: Jaswant Henson  MRN: [id number]  : 1955    Chief Complaint   Patient presents with   • Diabetes       History of Present Illness:   Jaswant Henson is a 67 y.o. male who presents for follow-up for type 2 diabetes.  He is accompanied by his wife today.  He remains on Lantus 70 units daily.  He has not been checking his blood sugars regularly but denies any trouble with hypoglycemia and reports these are typically good when he does check.  He fractured his right calcaneus earlier this summer and has been in a boot for about 4 months.  He is seeing the podiatrist weekly for follow-up appointments.  He reports this is healing but he also has a small sore on his heel as well.  He reports he is due for his eye exam and will try to get this scheduled.  He reports he lost his vision insurance and is concerned about cost.  He continues peritoneal dialysis.      Subjective     Review of Systems:   Review of Systems   Constitutional: Negative.    Cardiovascular: Negative.    Gastrointestinal: Negative.    Endocrine: Negative.    Hematological: Negative.        The following portions of the patient's history were reviewed and updated as appropriate: allergies, current medications, past family history, past medical history, past social history, past surgical history and problem list.    Objective     Vitals:    22 1503   BP: 126/79   Pulse: 77   SpO2: 97%   Height: 193 cm (76\")     Body mass index is 27.02 kg/m².    Physical Exam  Vitals reviewed.   Constitutional:       General: He is not in acute distress.     Appearance: Normal appearance.      Comments: In a wheelchair today.   Neurological:      Mental Status: He is alert.         HEMOGLOBIN A1C  Lab Results   Component Value Date    HGBA1C 7.5 2022         Assessment / Plan      Assessment & Plan:  1. Type 2 diabetes mellitus with diabetic polyneuropathy, with long-term current use of insulin " (HCC)  His hemoglobin A1c has improved as compared to February at 7.5%.  This is acceptable.  He will continue his Lantus up to 80 units daily.  He did not need a new prescription today.  I encouraged him to continue to follow-up regularly with his podiatrist regarding his foot.  His blood pressure is okay today.  I encouraged healthy eating habits and physical activity as tolerated.  Patient to call as needed.  - POC Glucose, Blood  - POC Glycosylated Hemoglobin (Hb A1C)      Return in about 6 months (around 2/8/2023) for Recheck.     This note was dictated using Dragon voice recognition.    Jenn Doan PA-C  08/08/2022

## 2022-11-01 ENCOUNTER — LAB (OUTPATIENT)
Dept: LAB | Facility: HOSPITAL | Age: 67
End: 2022-11-01

## 2022-11-01 DIAGNOSIS — E55.9 AVITAMINOSIS D: ICD-10-CM

## 2022-11-01 DIAGNOSIS — N13.8 ENLARGED PROSTATE WITH URINARY OBSTRUCTION: ICD-10-CM

## 2022-11-01 DIAGNOSIS — D64.9 ANEMIA DUE TO DISTURBANCE OF HEMOGLOBIN SYNTHESIS: ICD-10-CM

## 2022-11-01 DIAGNOSIS — N40.1 ENLARGED PROSTATE WITH URINARY OBSTRUCTION: ICD-10-CM

## 2022-11-01 DIAGNOSIS — E03.9 SEVERE HYPOTHYROIDISM: ICD-10-CM

## 2022-11-01 DIAGNOSIS — E11.9 DIABETES MELLITUS, STABLE: ICD-10-CM

## 2022-11-01 DIAGNOSIS — E78.2 MIXED HYPERLIPIDEMIA: Primary | ICD-10-CM

## 2022-11-01 LAB
ALBUMIN SERPL-MCNC: 4 G/DL (ref 3.5–5.2)
ALBUMIN/GLOB SERPL: 1.5 G/DL
ALP SERPL-CCNC: 95 U/L (ref 39–117)
ALT SERPL W P-5'-P-CCNC: 24 U/L (ref 1–41)
ANION GAP SERPL CALCULATED.3IONS-SCNC: 18.2 MMOL/L (ref 5–15)
AST SERPL-CCNC: 23 U/L (ref 1–40)
BILIRUB SERPL-MCNC: 0.3 MG/DL (ref 0–1.2)
BUN SERPL-MCNC: 38 MG/DL (ref 8–23)
BUN/CREAT SERPL: 5.9 (ref 7–25)
CA-I BLD-MCNC: 5.4 MG/DL (ref 4.6–5.4)
CA-I SERPL ISE-MCNC: 1.35 MMOL/L (ref 1.15–1.35)
CALCIUM SPEC-SCNC: 9.6 MG/DL (ref 8.6–10.5)
CHLORIDE SERPL-SCNC: 94 MMOL/L (ref 98–107)
CHOLEST SERPL-MCNC: 191 MG/DL (ref 0–200)
CO2 SERPL-SCNC: 24.8 MMOL/L (ref 22–29)
CREAT SERPL-MCNC: 6.39 MG/DL (ref 0.76–1.27)
DEPRECATED RDW RBC AUTO: 45.6 FL (ref 37–54)
EGFRCR SERPLBLD CKD-EPI 2021: 9 ML/MIN/1.73
ERYTHROCYTE [DISTWIDTH] IN BLOOD BY AUTOMATED COUNT: 13.7 % (ref 12.3–15.4)
FERRITIN SERPL-MCNC: 441 NG/ML (ref 30–400)
GLOBULIN UR ELPH-MCNC: 2.6 GM/DL
GLUCOSE SERPL-MCNC: 143 MG/DL (ref 65–99)
HBA1C MFR BLD: 8.3 % (ref 4.8–5.6)
HCT VFR BLD AUTO: 37.1 % (ref 37.5–51)
HDLC SERPL-MCNC: 26 MG/DL (ref 40–60)
HGB BLD-MCNC: 13 G/DL (ref 13–17.7)
IRON 24H UR-MRATE: 79 MCG/DL (ref 59–158)
IRON SATN MFR SERPL: 30 % (ref 20–50)
LDLC SERPL CALC-MCNC: 84 MG/DL (ref 0–100)
LDLC/HDLC SERPL: 2.52 {RATIO}
MCH RBC QN AUTO: 32.2 PG (ref 26.6–33)
MCHC RBC AUTO-ENTMCNC: 35 G/DL (ref 31.5–35.7)
MCV RBC AUTO: 91.8 FL (ref 79–97)
PHOSPHATE SERPL-MCNC: 3.8 MG/DL (ref 2.5–4.5)
PLATELET # BLD AUTO: 241 10*3/MM3 (ref 140–450)
PMV BLD AUTO: 10 FL (ref 6–12)
POTASSIUM SERPL-SCNC: 3.5 MMOL/L (ref 3.5–5.2)
PROT SERPL-MCNC: 6.6 G/DL (ref 6–8.5)
PSA SERPL-MCNC: 0.69 NG/ML (ref 0–4)
PTH-INTACT SERPL-MCNC: 369 PG/ML (ref 15–65)
RBC # BLD AUTO: 4.04 10*6/MM3 (ref 4.14–5.8)
SODIUM SERPL-SCNC: 137 MMOL/L (ref 136–145)
T3FREE SERPL-MCNC: 1.94 PG/ML (ref 2–4.4)
T4 FREE SERPL-MCNC: 1 NG/DL (ref 0.93–1.7)
TIBC SERPL-MCNC: 267 MCG/DL (ref 298–536)
TRANSFERRIN SERPL-MCNC: 179 MG/DL (ref 200–360)
TRIGL SERPL-MCNC: 497 MG/DL (ref 0–150)
TSH SERPL DL<=0.05 MIU/L-ACNC: 3.18 UIU/ML (ref 0.27–4.2)
VLDLC SERPL-MCNC: 81 MG/DL (ref 5–40)
WBC NRBC COR # BLD: 8.11 10*3/MM3 (ref 3.4–10.8)

## 2022-11-01 PROCEDURE — 84481 FREE ASSAY (FT-3): CPT

## 2022-11-01 PROCEDURE — 84100 ASSAY OF PHOSPHORUS: CPT

## 2022-11-01 PROCEDURE — 84439 ASSAY OF FREE THYROXINE: CPT

## 2022-11-01 PROCEDURE — 83036 HEMOGLOBIN GLYCOSYLATED A1C: CPT

## 2022-11-01 PROCEDURE — 84443 ASSAY THYROID STIM HORMONE: CPT

## 2022-11-01 PROCEDURE — 82330 ASSAY OF CALCIUM: CPT

## 2022-11-01 PROCEDURE — 80061 LIPID PANEL: CPT

## 2022-11-01 PROCEDURE — 83540 ASSAY OF IRON: CPT

## 2022-11-01 PROCEDURE — 84153 ASSAY OF PSA TOTAL: CPT

## 2022-11-01 PROCEDURE — 84466 ASSAY OF TRANSFERRIN: CPT

## 2022-11-01 PROCEDURE — 83970 ASSAY OF PARATHORMONE: CPT

## 2022-11-01 PROCEDURE — 36415 COLL VENOUS BLD VENIPUNCTURE: CPT

## 2022-11-01 PROCEDURE — 82728 ASSAY OF FERRITIN: CPT

## 2022-11-01 PROCEDURE — 85027 COMPLETE CBC AUTOMATED: CPT

## 2022-11-01 PROCEDURE — 80053 COMPREHEN METABOLIC PANEL: CPT

## 2022-11-07 NOTE — TELEPHONE ENCOUNTER
Spoke with patient.  States he does not test his blood sugar but it is going to  some strips and he may start checking it occasionally.  He states that he is not able to exercise right now due to being in a boot.  He is currently taking 80 units of Lantus daily.

## 2022-11-07 NOTE — TELEPHONE ENCOUNTER
Lets' increase his lantus to 84 units daily and have him check some blood glucose readings and send these in for adjustment.  Thanks RT

## 2022-11-29 RX ORDER — INSULIN GLARGINE 100 [IU]/ML
INJECTION, SOLUTION SUBCUTANEOUS
Qty: 30 ML | Refills: 5 | Status: SHIPPED | OUTPATIENT
Start: 2022-11-29

## 2022-11-29 NOTE — TELEPHONE ENCOUNTER
PATIENT IS CALLING STATING WE CHANGED HIS LANTUS PRESCRIPTION FROM 70 UNITS TO 84 UNITS. PHARMACY IS NEEDING THE NEW PRESCRIPTION FOR 84 UNITS WITH REFILLS. CURRENTLY PHARMACY ONLY HAS THE 70 UNIT PRESCRIPTION.

## 2022-12-05 RX ORDER — PEN NEEDLE, DIABETIC 32GX 5/32"
NEEDLE, DISPOSABLE MISCELLANEOUS
Qty: 400 EACH | Refills: 1 | Status: SHIPPED | OUTPATIENT
Start: 2022-12-05 | End: 2023-02-09

## 2023-02-09 ENCOUNTER — OFFICE VISIT (OUTPATIENT)
Dept: ENDOCRINOLOGY | Facility: CLINIC | Age: 68
End: 2023-02-09
Payer: MEDICARE

## 2023-02-09 VITALS
SYSTOLIC BLOOD PRESSURE: 122 MMHG | WEIGHT: 224 LBS | HEART RATE: 98 BPM | OXYGEN SATURATION: 96 % | BODY MASS INDEX: 27.28 KG/M2 | DIASTOLIC BLOOD PRESSURE: 88 MMHG | HEIGHT: 76 IN

## 2023-02-09 DIAGNOSIS — E11.42 TYPE 2 DIABETES MELLITUS WITH DIABETIC POLYNEUROPATHY, WITH LONG-TERM CURRENT USE OF INSULIN: Primary | ICD-10-CM

## 2023-02-09 DIAGNOSIS — Z79.4 TYPE 2 DIABETES MELLITUS WITH DIABETIC POLYNEUROPATHY, WITH LONG-TERM CURRENT USE OF INSULIN: Primary | ICD-10-CM

## 2023-02-09 LAB
EXPIRATION DATE: ABNORMAL
EXPIRATION DATE: NORMAL
GLUCOSE BLDC GLUCOMTR-MCNC: 157 MG/DL (ref 70–130)
HBA1C MFR BLD: 7.3 %
Lab: ABNORMAL
Lab: NORMAL

## 2023-02-09 PROCEDURE — 83036 HEMOGLOBIN GLYCOSYLATED A1C: CPT | Performed by: PHYSICIAN ASSISTANT

## 2023-02-09 PROCEDURE — 99213 OFFICE O/P EST LOW 20 MIN: CPT | Performed by: PHYSICIAN ASSISTANT

## 2023-02-09 PROCEDURE — 3051F HG A1C>EQUAL 7.0%<8.0%: CPT | Performed by: PHYSICIAN ASSISTANT

## 2023-02-09 PROCEDURE — 82947 ASSAY GLUCOSE BLOOD QUANT: CPT | Performed by: PHYSICIAN ASSISTANT

## 2023-02-09 RX ORDER — HYDRALAZINE HYDROCHLORIDE 100 MG/1
100 TABLET, FILM COATED ORAL DAILY
COMMUNITY
Start: 2023-01-24

## 2023-02-09 RX ORDER — PEN NEEDLE, DIABETIC 32GX 5/32"
NEEDLE, DISPOSABLE MISCELLANEOUS
Qty: 400 EACH | Refills: 1
Start: 2023-02-09

## 2023-02-09 NOTE — PROGRESS NOTES
"     Office Note      Date: 2023  Patient Name: Jaswant Henson  MRN: [id number]  : 1955    Chief Complaint   Patient presents with   • Diabetes       History of Present Illness:   Jaswant Henson is a 68 y.o. male who presents for follow-up for type 2 diabetes.  He reports he is taking Lantus 80 units daily.  He reports he is not checking his blood sugars regularly due to tremor but has noted the last 3-4 mornings he has felt shaky and weak like his blood glucose is low.  He continues to see the podiatrist every 2 weeks due to the sore over his heel.  He is wearing a walking boot today.  He reports he is due for his eye exam but since he is not driving currently and his wife has to take him to appointments he has not been able to get this scheduled.  He continues peritoneal dialysis.  He reports hydralazine was added for his blood pressure control and he has noted reduced appetite with this drug.      Subjective     Review of Systems:   Review of Systems   Constitutional: Negative.    Cardiovascular: Negative.    Gastrointestinal: Negative.    Endocrine: Negative.    Neurological: Negative.        The following portions of the patient's history were reviewed and updated as appropriate: allergies, current medications, past family history, past medical history, past social history, past surgical history and problem list.    Objective     Vitals:    23 1346   BP: 122/88   Pulse: 98   SpO2: 96%   Weight: 102 kg (224 lb)   Height: 193 cm (76\")   PainSc: 0-No pain     Body mass index is 27.27 kg/m².    Physical Exam  Vitals reviewed.   Constitutional:       General: He is not in acute distress.     Appearance: Normal appearance.      Comments: He is using a walker to ambulate today.   Neurological:      Mental Status: He is alert.         HEMOGLOBIN A1C  Lab Results   Component Value Date    HGBA1C 7.3 2023       GLUCOSE  Glucose   Date Value Ref Range Status   2023 157 (A) 70 - 130 " mg/dL Final       CMP  Lab Results   Component Value Date    GLUCOSE 143 (H) 11/01/2022    BUN 38 (H) 11/01/2022    CREATININE 6.39 (H) 11/01/2022    EGFRIFNONA 6 (L) 09/08/2021    EGFRIFAFRI  09/08/2021      Comment:      <15 Indicative of kidney failure.    BCR 5.9 (L) 11/01/2022    K 3.5 11/01/2022    CO2 24.8 11/01/2022    CALCIUM 9.6 11/01/2022    PROTENTOTREF 6.9 09/29/2017    LABIL2 1.0 09/29/2017    AST 23 11/01/2022    ALT 24 11/01/2022       LIPID PANEL  Lab Results   Component Value Date    CHOL 191 11/01/2022    CHLPL 99 01/04/2016    TRIG 497 (H) 11/01/2022    HDL 26 (L) 11/01/2022    LDL 84 11/01/2022       TSH  Lab Results   Component Value Date    TSH 3.180 11/01/2022       Assessment / Plan      Assessment & Plan:  1. Type 2 diabetes mellitus with diabetic polyneuropathy, with long-term current use of insulin (HCC)  His hemoglobin A1c today has improved at 7.3%.  He does not check blood glucose readings due to tremor but overall feels okay.  Since he has felt he has had some lower readings in the mornings we will reduce his Lantus to 75 units daily.  We will reach out to me if he continues to have trouble.  I encouraged healthy eating habits  His blood pressure is okay today.  - POC Glucose, Blood  - POC Glycosylated Hemoglobin (Hb A1C)      Return in about 6 months (around 8/9/2023) for Recheck.     This note was dictated using Dragon voice recognition.    Jenn Doan PA-C  02/09/2023

## 2023-07-24 RX ORDER — INSULIN GLARGINE 100 [IU]/ML
INJECTION, SOLUTION SUBCUTANEOUS
Qty: 30 ML | Refills: 3 | Status: SHIPPED | OUTPATIENT
Start: 2023-07-24

## 2023-08-14 ENCOUNTER — OFFICE VISIT (OUTPATIENT)
Dept: ENDOCRINOLOGY | Facility: CLINIC | Age: 68
End: 2023-08-14
Payer: MEDICARE

## 2023-08-14 VITALS
WEIGHT: 217 LBS | SYSTOLIC BLOOD PRESSURE: 126 MMHG | OXYGEN SATURATION: 96 % | DIASTOLIC BLOOD PRESSURE: 58 MMHG | HEIGHT: 76 IN | BODY MASS INDEX: 26.42 KG/M2 | HEART RATE: 80 BPM

## 2023-08-14 DIAGNOSIS — E11.65 TYPE 2 DIABETES MELLITUS WITH HYPERGLYCEMIA, WITH LONG-TERM CURRENT USE OF INSULIN: Primary | ICD-10-CM

## 2023-08-14 DIAGNOSIS — Z79.4 TYPE 2 DIABETES MELLITUS WITH HYPERGLYCEMIA, WITH LONG-TERM CURRENT USE OF INSULIN: Primary | ICD-10-CM

## 2023-08-14 LAB
EXPIRATION DATE: ABNORMAL
EXPIRATION DATE: NORMAL
GLUCOSE BLDC GLUCOMTR-MCNC: 207 MG/DL (ref 70–130)
HBA1C MFR BLD: 7.1 %
Lab: ABNORMAL
Lab: NORMAL

## 2023-08-14 PROCEDURE — 82947 ASSAY GLUCOSE BLOOD QUANT: CPT | Performed by: PHYSICIAN ASSISTANT

## 2023-08-14 PROCEDURE — 99213 OFFICE O/P EST LOW 20 MIN: CPT | Performed by: PHYSICIAN ASSISTANT

## 2023-08-14 PROCEDURE — 1159F MED LIST DOCD IN RCRD: CPT | Performed by: PHYSICIAN ASSISTANT

## 2023-08-14 PROCEDURE — 1160F RVW MEDS BY RX/DR IN RCRD: CPT | Performed by: PHYSICIAN ASSISTANT

## 2023-08-14 PROCEDURE — 3051F HG A1C>EQUAL 7.0%<8.0%: CPT | Performed by: PHYSICIAN ASSISTANT

## 2023-08-14 PROCEDURE — 83036 HEMOGLOBIN GLYCOSYLATED A1C: CPT | Performed by: PHYSICIAN ASSISTANT

## 2023-08-14 RX ORDER — BLOOD SUGAR DIAGNOSTIC
STRIP MISCELLANEOUS
Refills: 12
Start: 2023-08-14

## 2023-08-14 RX ORDER — INSULIN GLARGINE 100 [IU]/ML
INJECTION, SOLUTION SUBCUTANEOUS
Qty: 30 ML | Refills: 6 | Status: SHIPPED | OUTPATIENT
Start: 2023-08-14

## 2023-08-14 NOTE — PROGRESS NOTES
"     Office Note      Date: 2023  Patient Name: Jaswant Henson  MRN: [id number]  : 1955    Chief Complaint   Patient presents with    Diabetes     Type 2 diabetes mellitus with hyperglycemia, with long-term current use of insulin       History of Present Illness:   Jaswant Henson is a 68 y.o. male who presents for follow-up for type 2 diabetes diagnosed in   He remains on Lantus 75 units daily.  He reports he has checked his blood glucose this morning and it was 145 mg/dL.  He reports he does not check his blood sugars on a regular basis but has had some higher readings on blood work and different physicians offices.  Reports he has had a few low blood sugars in the mornings but has not checked these he just had symptoms of hypoglycemia.  This does not happen regularly.  He continues peritoneal dialysis regularly.  He is up-to-date on his eye exam he had his appointment since last visit.  He continues to see the podiatrist every 2 weeks.  He reports his foot is healing but has not completely healed yet.      Subjective     Review of Systems:   Review of Systems   Constitutional: Negative.    Cardiovascular: Negative.    Gastrointestinal: Negative.    Endocrine: Negative.    Neurological: Negative.      The following portions of the patient's history were reviewed and updated as appropriate: allergies, current medications, past family history, past medical history, past social history, past surgical history, and problem list.    Objective     Vitals:    23 1442   BP: 126/58   BP Location: Right arm   Patient Position: Sitting   Cuff Size: Adult   Pulse: 80   SpO2: 96%   Weight: 98.4 kg (217 lb)   Height: 193 cm (76\")     Body mass index is 26.41 kg/mý.    Physical Exam  Vitals reviewed.   Constitutional:       General: He is not in acute distress.     Appearance: Normal appearance.   Neurological:      Mental Status: He is alert.       HEMOGLOBIN A1C  Lab Results   Component Value Date "    HGBA1C 7.1 08/14/2023       GLUCOSE  Glucose   Date Value Ref Range Status   08/14/2023 207 (A) 70 - 130 mg/dL Final             Assessment / Plan      Assessment & Plan:  1. Type 2 diabetes mellitus with hyperglycemia, with long-term current use of insulin  His hemoglobin A1c is 7.1% today.  This is acceptable.  He has had some higher readings on blood work.  We discussed the risk of hypoglycemia with his kidney function where it is.  For now he will continue Lantus 75 units daily and I encouraged him to check some blood sugar readings for review and adjustment.  We discussed that if his blood glucose readings are staying elevated in the 200s and 300s we may need to consider adding mealtime insulin.  I gave him a new One Touch Verio glucometer to use today.  His weight is down 7 pounds since his appointment in February.  I encouraged healthy eating habits and physical activity as tolerated.  His blood pressure is okay today.  He will continue his current medications.  He sees the nephrologist twice a month.  - POC Glycosylated Hemoglobin (Hb A1C)  - POC Glucose, Blood      Return in about 6 months (around 2/14/2024) for Recheck.     This note was dictated using Dragon voice recognition.    Jenn Doan PA-C  08/14/2023

## 2023-08-28 RX ORDER — PEN NEEDLE, DIABETIC 32GX 5/32"
NEEDLE, DISPOSABLE MISCELLANEOUS
Qty: 300 EACH | Refills: 2 | Status: SHIPPED | OUTPATIENT
Start: 2023-08-28

## 2023-09-11 ENCOUNTER — OFFICE VISIT (OUTPATIENT)
Dept: CARDIOLOGY | Facility: CLINIC | Age: 68
End: 2023-09-11
Payer: MEDICARE

## 2023-09-11 VITALS
HEART RATE: 76 BPM | HEIGHT: 76 IN | SYSTOLIC BLOOD PRESSURE: 132 MMHG | WEIGHT: 215 LBS | DIASTOLIC BLOOD PRESSURE: 70 MMHG | OXYGEN SATURATION: 96 % | BODY MASS INDEX: 26.18 KG/M2

## 2023-09-11 DIAGNOSIS — I10 HYPERTENSION, UNSPECIFIED TYPE: ICD-10-CM

## 2023-09-11 DIAGNOSIS — I50.31 ACUTE DIASTOLIC CHF (CONGESTIVE HEART FAILURE): ICD-10-CM

## 2023-09-11 DIAGNOSIS — E11.69 TYPE 2 DIABETES MELLITUS WITH OTHER SPECIFIED COMPLICATION, WITH LONG-TERM CURRENT USE OF INSULIN: ICD-10-CM

## 2023-09-11 DIAGNOSIS — I34.0 NONRHEUMATIC MITRAL VALVE REGURGITATION: Primary | ICD-10-CM

## 2023-09-11 DIAGNOSIS — E78.5 HYPERLIPIDEMIA, UNSPECIFIED HYPERLIPIDEMIA TYPE: ICD-10-CM

## 2023-09-11 DIAGNOSIS — Z79.4 TYPE 2 DIABETES MELLITUS WITH OTHER SPECIFIED COMPLICATION, WITH LONG-TERM CURRENT USE OF INSULIN: ICD-10-CM

## 2023-09-11 DIAGNOSIS — N18.6 ESRD (END STAGE RENAL DISEASE): ICD-10-CM

## 2023-09-11 DIAGNOSIS — I35.0 NONRHEUMATIC AORTIC VALVE STENOSIS: ICD-10-CM

## 2023-09-11 PROBLEM — R80.9 PROTEINURIA: Status: ACTIVE | Noted: 2023-09-11

## 2023-09-11 PROBLEM — D63.1 ANEMIA IN CHRONIC KIDNEY DISEASE: Status: ACTIVE | Noted: 2020-04-20

## 2023-09-11 PROBLEM — Z99.2 ENCOUNTER FOR PERITONEAL DIALYSIS: Status: ACTIVE | Noted: 2020-04-20

## 2023-09-11 PROBLEM — N18.9 ANEMIA IN CHRONIC KIDNEY DISEASE: Status: ACTIVE | Noted: 2020-04-20

## 2023-09-11 PROBLEM — B19.20 HEPATITIS C: Status: ACTIVE | Noted: 2023-09-11

## 2023-09-11 PROBLEM — L25.9 CONTACT DERMATITIS: Status: ACTIVE | Noted: 2020-06-10

## 2023-09-11 PROBLEM — M86.149: Status: ACTIVE | Noted: 2020-04-20

## 2023-09-11 PROBLEM — Z92.25 INCREASED INFECTION RISK STATUS POST IMMUNOSUPPRESSIVE THERAPY: Status: ACTIVE | Noted: 2020-04-20

## 2023-09-11 PROBLEM — D64.9 ANEMIA: Status: ACTIVE | Noted: 2023-09-11

## 2023-09-11 PROBLEM — M35.3 POLYMYALGIA RHEUMATICA: Status: ACTIVE | Noted: 2020-04-20

## 2023-09-11 PROBLEM — L03.012 CELLULITIS OF LEFT FINGER: Status: ACTIVE | Noted: 2020-04-20

## 2023-09-11 PROBLEM — R50.9 FEVER WITH CHILLS: Status: ACTIVE | Noted: 2020-04-20

## 2023-09-11 PROCEDURE — 3078F DIAST BP <80 MM HG: CPT | Performed by: INTERNAL MEDICINE

## 2023-09-11 PROCEDURE — 3075F SYST BP GE 130 - 139MM HG: CPT | Performed by: INTERNAL MEDICINE

## 2023-09-11 PROCEDURE — 99204 OFFICE O/P NEW MOD 45 MIN: CPT | Performed by: INTERNAL MEDICINE

## 2023-09-11 PROCEDURE — 93000 ELECTROCARDIOGRAM COMPLETE: CPT | Performed by: INTERNAL MEDICINE

## 2023-09-11 RX ORDER — SEVELAMER CARBONATE 800 MG/1
TABLET, FILM COATED ORAL
COMMUNITY
End: 2023-09-11

## 2023-09-11 RX ORDER — TRIAMCINOLONE ACETONIDE 1 MG/G
CREAM TOPICAL SEE ADMIN INSTRUCTIONS
COMMUNITY
End: 2023-09-11

## 2023-09-11 RX ORDER — SUCROFERRIC OXYHYDROXIDE 500 MG/1
TABLET, CHEWABLE ORAL
COMMUNITY
End: 2023-09-11

## 2023-09-11 RX ORDER — MAGNESIUM OXIDE 400 MG/1
400 TABLET ORAL EVERY EVENING
COMMUNITY

## 2023-09-11 RX ORDER — CALCITRIOL 0.25 UG/1
1 CAPSULE, LIQUID FILLED ORAL DAILY
COMMUNITY

## 2023-09-11 RX ORDER — ACETAMINOPHEN 500 MG
1000 TABLET ORAL NIGHTLY
COMMUNITY

## 2023-09-11 RX ORDER — MELATONIN
2000 DAILY
COMMUNITY

## 2023-09-11 RX ORDER — CHOLECALCIFEROL (VITAMIN D3) 125 MCG
10 CAPSULE ORAL NIGHTLY
COMMUNITY

## 2023-09-11 RX ORDER — NITROGLYCERIN 20 MG/G
OINTMENT TOPICAL
COMMUNITY

## 2023-09-11 NOTE — PROGRESS NOTES
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 Henson is a 68 year old man with above hx.  Has been on peritoneal dialysis since 2015.  Right foot in boot and has non healing diabetic ulcer.  He has no energy and short of breath with any exertion.  Recent echo showed the above and referred here.  ROS negative except for the above.         Objective   Vital Signs:  Vitals:    09/11/23 1431   BP: 132/70   Pulse: 76   SpO2: 96%     Estimated body mass index is 26.17 kg/m² as calculated from the following:    Height as of this encounter: 193 cm (76\").    Weight as of this encounter: 97.5 kg (215 lb).       Physical Exam  HENT:      Head: Normocephalic.   Eyes:      Extraocular Movements: Extraocular movements intact.   Cardiovascular:      Rate and Rhythm: Normal rate and regular rhythm.      Heart sounds: Murmur heard.   Systolic murmur is present with a grade of 2/6.   Diastolic murmur is present with a grade of 2/4.     No gallop.   Pulmonary:      Breath sounds: Normal breath sounds.   Abdominal:      Palpations: Abdomen is soft.   Musculoskeletal:      Right lower leg: No edema.      Left lower leg: No edema.   Skin:     General: Skin is warm and dry.   Neurological:      General: No focal deficit present.      Mental Status: He is alert.   Psychiatric:         Mood and Affect: Mood normal.   Foot in boot    ECG 12 Lead    Date/Time: 9/11/2023 3:18 PM  Performed by: Miguel Vidal MD  Authorized by: Miguel Vidal MD     Clinical impression: abnormal EKG  Comments: Atrial rhythm, poor r-r progression         Assessment   -Severe MR secondary to MAC, " mild/moderate aortic stenosis, left atrium severely dilated, RA pressure elevated  -ESRD on peritoneal dialysis  -HTN  -DM  -non healing left foot ulcer  -EKG atrial rhythm, poor r-r progression    Plan   -Will repeat TTE and likely suggest RICKEY.  With that said it sounds like he has severe MR.  May need more fluid taken off and may need to discuss with nephrology.  His valvular diease with current state of health is likely inoperable (MAC is high risk, given ESRD would need a mechanical valve replacement, has non healing ulcer) but will need further eval to confirm that inclination.  Blood work and TTE.  Will see back in 2 weeks.    Tony Vidal MD    Return in about 2 weeks (around 9/25/2023).  Miguel Vidal MD  09/11/2023 15:18 EDT

## 2023-09-22 ENCOUNTER — TELEPHONE (OUTPATIENT)
Dept: CARDIOLOGY | Facility: CLINIC | Age: 68
End: 2023-09-22

## 2023-09-22 NOTE — TELEPHONE ENCOUNTER
Caller: Heather Henson    Relationship to patient: Emergency Contact    Best call back number: [phone]    Chief complaint: PTS WIFE CALLED IN TO REQUEST APPT BE AFTER ECHO WHICH IS SCHEDULED 10.30.23    Type of visit: 2 WEEK FU    Requested date: 11.3.23     If rescheduling, when is the original appointment: 9.29.23     Additional notes:CHANGED APPT TO 11.3.23 PER PTS REQUEST. IF PT NEEDS TO BE SEEN SOONER PLEASE REACH OUT TO ADVISE, THANK YOU

## 2023-10-04 ENCOUNTER — LAB (OUTPATIENT)
Dept: LAB | Facility: HOSPITAL | Age: 68
End: 2023-10-04
Payer: MEDICARE

## 2023-10-04 DIAGNOSIS — E78.5 HYPERLIPIDEMIA, UNSPECIFIED HYPERLIPIDEMIA TYPE: ICD-10-CM

## 2023-10-04 DIAGNOSIS — D64.9 ANEMIA, UNSPECIFIED TYPE: ICD-10-CM

## 2023-10-04 DIAGNOSIS — E11.9 DIABETES MELLITUS, STABLE: Primary | ICD-10-CM

## 2023-10-04 DIAGNOSIS — N18.6 ESRD (END STAGE RENAL DISEASE): ICD-10-CM

## 2023-10-04 DIAGNOSIS — I10 HYPERTENSION, UNSPECIFIED TYPE: ICD-10-CM

## 2023-10-04 DIAGNOSIS — E55.9 AVITAMINOSIS D: ICD-10-CM

## 2023-10-04 DIAGNOSIS — Z79.4 TYPE 2 DIABETES MELLITUS WITH OTHER SPECIFIED COMPLICATION, WITH LONG-TERM CURRENT USE OF INSULIN: ICD-10-CM

## 2023-10-04 DIAGNOSIS — E03.9 SEVERE HYPOTHYROIDISM: ICD-10-CM

## 2023-10-04 DIAGNOSIS — E11.69 TYPE 2 DIABETES MELLITUS WITH OTHER SPECIFIED COMPLICATION, WITH LONG-TERM CURRENT USE OF INSULIN: ICD-10-CM

## 2023-10-04 DIAGNOSIS — I34.0 NONRHEUMATIC MITRAL VALVE REGURGITATION: ICD-10-CM

## 2023-10-04 DIAGNOSIS — I50.31 ACUTE DIASTOLIC CHF (CONGESTIVE HEART FAILURE): ICD-10-CM

## 2023-10-04 DIAGNOSIS — I35.0 NONRHEUMATIC AORTIC VALVE STENOSIS: ICD-10-CM

## 2023-10-04 LAB
25(OH)D3 SERPL-MCNC: 50.9 NG/ML (ref 30–100)
ALBUMIN SERPL-MCNC: 3.4 G/DL (ref 3.5–5.2)
ALBUMIN/GLOB SERPL: 1.3 G/DL
ALP SERPL-CCNC: 113 U/L (ref 39–117)
ALT SERPL W P-5'-P-CCNC: 25 U/L (ref 1–41)
ANION GAP SERPL CALCULATED.3IONS-SCNC: 15 MMOL/L (ref 5–15)
AST SERPL-CCNC: 22 U/L (ref 1–40)
BASOPHILS # BLD AUTO: 0.06 10*3/MM3 (ref 0–0.2)
BASOPHILS NFR BLD AUTO: 0.9 % (ref 0–1.5)
BILIRUB SERPL-MCNC: 0.3 MG/DL (ref 0–1.2)
BUN SERPL-MCNC: 38 MG/DL (ref 8–23)
BUN/CREAT SERPL: 5.3 (ref 7–25)
CA-I BLD-MCNC: 5 MG/DL (ref 4.6–5.4)
CA-I SERPL ISE-MCNC: 1.24 MMOL/L (ref 1.15–1.35)
CALCIUM SPEC-SCNC: 9.9 MG/DL (ref 8.6–10.5)
CHLORIDE SERPL-SCNC: 95 MMOL/L (ref 98–107)
CHOLEST SERPL-MCNC: 148 MG/DL (ref 0–200)
CO2 SERPL-SCNC: 27 MMOL/L (ref 22–29)
CREAT SERPL-MCNC: 7.2 MG/DL (ref 0.76–1.27)
CRP SERPL-MCNC: 0.62 MG/DL (ref 0.01–0.5)
DEPRECATED RDW RBC AUTO: 50.6 FL (ref 37–54)
EGFRCR SERPLBLD CKD-EPI 2021: 7.7 ML/MIN/1.73
EOSINOPHIL # BLD AUTO: 0.6 10*3/MM3 (ref 0–0.4)
EOSINOPHIL NFR BLD AUTO: 8.7 % (ref 0.3–6.2)
ERYTHROCYTE [DISTWIDTH] IN BLOOD BY AUTOMATED COUNT: 15.5 % (ref 12.3–15.4)
FERRITIN SERPL-MCNC: 224 NG/ML (ref 30–400)
GLOBULIN UR ELPH-MCNC: 2.7 GM/DL
GLUCOSE SERPL-MCNC: 100 MG/DL (ref 65–99)
HBA1C MFR BLD: 6.6 % (ref 4.8–5.6)
HCT VFR BLD AUTO: 40.6 % (ref 37.5–51)
HDLC SERPL-MCNC: 25 MG/DL (ref 40–60)
HGB BLD-MCNC: 13.9 G/DL (ref 13–17.7)
IMM GRANULOCYTES # BLD AUTO: 0.13 10*3/MM3 (ref 0–0.05)
IMM GRANULOCYTES NFR BLD AUTO: 1.9 % (ref 0–0.5)
LDLC SERPL CALC-MCNC: 77 MG/DL (ref 0–100)
LDLC/HDLC SERPL: 2.66 {RATIO}
LYMPHOCYTES # BLD AUTO: 1.09 10*3/MM3 (ref 0.7–3.1)
LYMPHOCYTES NFR BLD AUTO: 15.8 % (ref 19.6–45.3)
MCH RBC QN AUTO: 30.8 PG (ref 26.6–33)
MCHC RBC AUTO-ENTMCNC: 34.2 G/DL (ref 31.5–35.7)
MCV RBC AUTO: 89.8 FL (ref 79–97)
MONOCYTES # BLD AUTO: 0.99 10*3/MM3 (ref 0.1–0.9)
MONOCYTES NFR BLD AUTO: 14.3 % (ref 5–12)
NEUTROPHILS NFR BLD AUTO: 4.04 10*3/MM3 (ref 1.7–7)
NEUTROPHILS NFR BLD AUTO: 58.4 % (ref 42.7–76)
NRBC BLD AUTO-RTO: 0 /100 WBC (ref 0–0.2)
NT-PROBNP SERPL-MCNC: 4165 PG/ML (ref 0–900)
PHOSPHATE SERPL-MCNC: 4.4 MG/DL (ref 2.5–4.5)
PLATELET # BLD AUTO: 204 10*3/MM3 (ref 140–450)
PMV BLD AUTO: 9.9 FL (ref 6–12)
POTASSIUM SERPL-SCNC: 3.8 MMOL/L (ref 3.5–5.2)
PROT SERPL-MCNC: 6.1 G/DL (ref 6–8.5)
PTH-INTACT SERPL-MCNC: 285 PG/ML (ref 15–65)
RBC # BLD AUTO: 4.52 10*6/MM3 (ref 4.14–5.8)
SODIUM SERPL-SCNC: 137 MMOL/L (ref 136–145)
T3FREE SERPL-MCNC: 2.28 PG/ML (ref 2–4.4)
T4 FREE SERPL-MCNC: 0.9 NG/DL (ref 0.93–1.7)
TRIGL SERPL-MCNC: 283 MG/DL (ref 0–150)
TSH SERPL DL<=0.05 MIU/L-ACNC: 2.97 UIU/ML (ref 0.27–4.2)
VLDLC SERPL-MCNC: 46 MG/DL (ref 5–40)
WBC NRBC COR # BLD: 6.91 10*3/MM3 (ref 3.4–10.8)

## 2023-10-04 PROCEDURE — 83036 HEMOGLOBIN GLYCOSYLATED A1C: CPT

## 2023-10-04 PROCEDURE — 82652 VIT D 1 25-DIHYDROXY: CPT

## 2023-10-04 PROCEDURE — 84439 ASSAY OF FREE THYROXINE: CPT

## 2023-10-04 PROCEDURE — 80061 LIPID PANEL: CPT

## 2023-10-04 PROCEDURE — 82330 ASSAY OF CALCIUM: CPT

## 2023-10-04 PROCEDURE — 80053 COMPREHEN METABOLIC PANEL: CPT

## 2023-10-04 PROCEDURE — 85025 COMPLETE CBC W/AUTO DIFF WBC: CPT

## 2023-10-04 PROCEDURE — 83880 ASSAY OF NATRIURETIC PEPTIDE: CPT

## 2023-10-04 PROCEDURE — 83970 ASSAY OF PARATHORMONE: CPT

## 2023-10-04 PROCEDURE — 84443 ASSAY THYROID STIM HORMONE: CPT

## 2023-10-04 PROCEDURE — 86141 C-REACTIVE PROTEIN HS: CPT

## 2023-10-04 PROCEDURE — 84100 ASSAY OF PHOSPHORUS: CPT

## 2023-10-04 PROCEDURE — 36415 COLL VENOUS BLD VENIPUNCTURE: CPT

## 2023-10-04 PROCEDURE — 84481 FREE ASSAY (FT-3): CPT

## 2023-10-04 PROCEDURE — 82306 VITAMIN D 25 HYDROXY: CPT

## 2023-10-04 PROCEDURE — 82728 ASSAY OF FERRITIN: CPT

## 2023-10-06 LAB — 1,25(OH)2D SERPL-MCNC: 16.3 PG/ML (ref 24.8–81.5)

## 2023-10-09 NOTE — PROGRESS NOTES
BNP elevated.  Likely chronically elevated with kidney issue.  Good to have a baseline.  Triglycerides moderately elevated but not as bad as in past.  Crp mildly elevated.  Will monitor.

## 2023-10-30 ENCOUNTER — HOSPITAL ENCOUNTER (OUTPATIENT)
Dept: CARDIOLOGY | Facility: HOSPITAL | Age: 68
Discharge: HOME | End: 2023-10-30
Admitting: INTERNAL MEDICINE
Payer: MEDICARE

## 2023-10-30 ENCOUNTER — TELEPHONE (OUTPATIENT)
Dept: CARDIOLOGY | Facility: CLINIC | Age: 68
End: 2023-10-30

## 2023-10-30 VITALS — WEIGHT: 215 LBS | BODY MASS INDEX: 26.18 KG/M2 | HEIGHT: 76 IN

## 2023-10-30 DIAGNOSIS — I35.0 NONRHEUMATIC AORTIC VALVE STENOSIS: ICD-10-CM

## 2023-10-30 DIAGNOSIS — I10 HYPERTENSION, UNSPECIFIED TYPE: ICD-10-CM

## 2023-10-30 DIAGNOSIS — I50.31 ACUTE DIASTOLIC CHF (CONGESTIVE HEART FAILURE): ICD-10-CM

## 2023-10-30 DIAGNOSIS — I34.0 NONRHEUMATIC MITRAL VALVE REGURGITATION: ICD-10-CM

## 2023-10-30 DIAGNOSIS — E78.5 HYPERLIPIDEMIA, UNSPECIFIED HYPERLIPIDEMIA TYPE: ICD-10-CM

## 2023-10-30 DIAGNOSIS — N18.6 ESRD (END STAGE RENAL DISEASE): ICD-10-CM

## 2023-10-30 DIAGNOSIS — Z79.4 TYPE 2 DIABETES MELLITUS WITH OTHER SPECIFIED COMPLICATION, WITH LONG-TERM CURRENT USE OF INSULIN: ICD-10-CM

## 2023-10-30 DIAGNOSIS — E11.69 TYPE 2 DIABETES MELLITUS WITH OTHER SPECIFIED COMPLICATION, WITH LONG-TERM CURRENT USE OF INSULIN: ICD-10-CM

## 2023-10-30 LAB
ASCENDING AORTA: 3.6 CM
BH CV ECHO MEAS - AO MAX PG: 27.6 MMHG
BH CV ECHO MEAS - AO MEAN PG: 14.5 MMHG
BH CV ECHO MEAS - AO ROOT DIAM: 3.8 CM
BH CV ECHO MEAS - AO V2 MAX: 262.5 CM/SEC
BH CV ECHO MEAS - AO V2 VTI: 56.7 CM
BH CV ECHO MEAS - AVA(I,D): 1.7 CM2
BH CV ECHO MEAS - EDV(CUBED): 176.6 ML
BH CV ECHO MEAS - EDV(MOD-SP2): 119 ML
BH CV ECHO MEAS - EDV(MOD-SP4): 115 ML
BH CV ECHO MEAS - EF(MOD-BP): 60 %
BH CV ECHO MEAS - EF(MOD-SP2): 63 %
BH CV ECHO MEAS - EF(MOD-SP4): 56.5 %
BH CV ECHO MEAS - ESV(CUBED): 71 ML
BH CV ECHO MEAS - ESV(MOD-SP2): 44 ML
BH CV ECHO MEAS - ESV(MOD-SP4): 50 ML
BH CV ECHO MEAS - FS: 26.2 %
BH CV ECHO MEAS - IVS/LVPW: 0.97 CM
BH CV ECHO MEAS - IVSD: 1.13 CM
BH CV ECHO MEAS - LA DIMENSION: 4.1 CM
BH CV ECHO MEAS - LAT PEAK E' VEL: 7.6 CM/SEC
BH CV ECHO MEAS - LV DIASTOLIC VOL/BSA (35-75): 50.3 CM2
BH CV ECHO MEAS - LV MASS(C)D: 265.5 GRAMS
BH CV ECHO MEAS - LV MAX PG: 4.3 MMHG
BH CV ECHO MEAS - LV MEAN PG: 2 MMHG
BH CV ECHO MEAS - LV SYSTOLIC VOL/BSA (12-30): 21.9 CM2
BH CV ECHO MEAS - LV V1 MAX: 104 CM/SEC
BH CV ECHO MEAS - LV V1 VTI: 25.3 CM
BH CV ECHO MEAS - LVIDD: 5.6 CM
BH CV ECHO MEAS - LVIDS: 4.1 CM
BH CV ECHO MEAS - LVOT AREA: 3.8 CM2
BH CV ECHO MEAS - LVOT DIAM: 2.2 CM
BH CV ECHO MEAS - LVPWD: 1.17 CM
BH CV ECHO MEAS - MED PEAK E' VEL: 6.47 CM/SEC
BH CV ECHO MEAS - MR MAX PG: 123.2 MMHG
BH CV ECHO MEAS - MR MAX VEL: 555 CM/SEC
BH CV ECHO MEAS - MR MEAN PG: 83 MMHG
BH CV ECHO MEAS - MR MEAN VEL: 432 CM/SEC
BH CV ECHO MEAS - MR VTI: 181 CM
BH CV ECHO MEAS - MV A MAX VEL: 167 CM/SEC
BH CV ECHO MEAS - MV DEC SLOPE: 427 CM/SEC2
BH CV ECHO MEAS - MV DEC TIME: 0.35 SEC
BH CV ECHO MEAS - MV E MAX VEL: 172 CM/SEC
BH CV ECHO MEAS - MV E/A: 1.03
BH CV ECHO MEAS - MV MAX PG: 19.5 MMHG
BH CV ECHO MEAS - MV MEAN PG: 12 MMHG
BH CV ECHO MEAS - MV P1/2T: 151.6 MSEC
BH CV ECHO MEAS - MV V2 VTI: 69.7 CM
BH CV ECHO MEAS - MVA(P1/2T): 1.45 CM2
BH CV ECHO MEAS - MVA(VTI): 1.38 CM2
BH CV ECHO MEAS - PA ACC SLOPE: 424 CM/SEC2
BH CV ECHO MEAS - PA ACC TIME: 0.16 SEC
BH CV ECHO MEAS - PA V2 MAX: 124 CM/SEC
BH CV ECHO MEAS - RAP SYSTOLE: 3 MMHG
BH CV ECHO MEAS - RF(MV,LVOT)(1DIAM): 0.86 CM
BH CV ECHO MEAS - RVSP: 34.8 MMHG
BH CV ECHO MEAS - SI(MOD-SP2): 32.8 ML/M2
BH CV ECHO MEAS - SI(MOD-SP4): 28.4 ML/M2
BH CV ECHO MEAS - SV(LVOT): 96.2 ML
BH CV ECHO MEAS - SV(MOD-SP2): 75 ML
BH CV ECHO MEAS - SV(MOD-SP4): 65 ML
BH CV ECHO MEAS - TAPSE (>1.6): 3.2 CM
BH CV ECHO MEAS - TR MAX PG: 31.8 MMHG
BH CV ECHO MEAS - TR MAX VEL: 282 CM/SEC
BH CV ECHO MEASUREMENTS AVERAGE E/E' RATIO: 24.45
BH CV VAS BP LEFT ARM: NORMAL MMHG
BH CV XLRA - RV BASE: 3.8 CM
BH CV XLRA - RV LENGTH: 8.4 CM
BH CV XLRA - RV MID: 3.1 CM
BH CV XLRA - TDI S': 18.2 CM/SEC
IVRT: 108 MS
LEFT ATRIUM VOLUME INDEX: 41.2 ML/M2
MR PISA EROA: 0.5 CM2
MV REGURGITANT FRACTION: 14 %
MV REGURGITANT VOLUME: 91 CC
MV VENA CONTRACTA: 0.8 CM
PISA ALIASING VEL: 0.31 M/S
PISA RADIUS: 1.2 CM

## 2023-10-30 PROCEDURE — 93306 TTE W/DOPPLER COMPLETE: CPT | Performed by: INTERNAL MEDICINE

## 2023-10-30 PROCEDURE — 93306 TTE W/DOPPLER COMPLETE: CPT

## 2023-10-30 NOTE — TELEPHONE ENCOUNTER
Spoke with patient's wife regarding results and plan per Dr. Vidal. Patient's wife states they have an appointment Friday and will talk about the results in detail then. Patient's wife has no questions and is agreeable to plan.

## 2023-10-30 NOTE — TELEPHONE ENCOUNTER
----- Message from Miguel Vidal MD sent at 10/30/2023  2:51 PM EDT -----  Mitral valve quite thickened.  Please arrange telephone visit or in person visit to discuss next steps.  Telephone visit would be fine and I have time tomorrow.  Thank you

## 2023-10-30 NOTE — PROGRESS NOTES
Mitral valve quite thickened.  Please arrange telephone visit or in person visit to discuss next steps.  Telephone visit would be fine and I have time tomorrow.  Thank you

## 2023-11-03 ENCOUNTER — OFFICE VISIT (OUTPATIENT)
Dept: CARDIOLOGY | Facility: CLINIC | Age: 68
End: 2023-11-03
Payer: MEDICARE

## 2023-11-03 VITALS
SYSTOLIC BLOOD PRESSURE: 132 MMHG | OXYGEN SATURATION: 97 % | BODY MASS INDEX: 26.18 KG/M2 | HEIGHT: 76 IN | HEART RATE: 70 BPM | WEIGHT: 215 LBS | DIASTOLIC BLOOD PRESSURE: 88 MMHG

## 2023-11-03 DIAGNOSIS — I34.0 NONRHEUMATIC MITRAL VALVE REGURGITATION: Primary | ICD-10-CM

## 2023-11-03 NOTE — PROGRESS NOTES
"Chief Complaint  Follow-up (echocardiogram/)      Subjective   History of Present Illness    Problem List  -Severe MR and severe mitral stenosis secondary to MAC, mild/moderate aortic stenosis, left atrium severely dilated, RA pressure elevated  -ESRD on peritoneal dialysis  -HTN  -DM  -non healing left foot ulcer  -EKG atrial rhythm, poor r-r progression    Mr. Henson is a 68 year old man with above hx.  Has been on peritoneal dialysis since 2015.  Right foot in boot and has non healing diabetic ulcer.  He has no energy and short of breath with any exertion.  Recent echo showed the above and referred here.  ROS negative except for the above.      Update 11/3/23  No changes went over echo       Objective   Vital Signs:  Vitals:    11/03/23 1440   BP: 132/88   Pulse: 70   SpO2: 97%     Estimated body mass index is 26.17 kg/m² as calculated from the following:    Height as of this encounter: 193 cm (76\").    Weight as of this encounter: 97.5 kg (215 lb).       Physical Exam  HENT:      Head: Normocephalic.   Eyes:      Extraocular Movements: Extraocular movements intact.   Cardiovascular:      Rate and Rhythm: Normal rate and regular rhythm.      Heart sounds: Murmur heard.      Systolic murmur is present with a grade of 2/6.      Diastolic murmur is present with a grade of 2/4.      No gallop.   Pulmonary:      Breath sounds: Normal breath sounds.   Abdominal:      Palpations: Abdomen is soft.   Musculoskeletal:      Right lower leg: No edema.      Left lower leg: No edema.   Skin:     General: Skin is warm and dry.   Neurological:      General: No focal deficit present.      Mental Status: He is alert.   Psychiatric:         Mood and Affect: Mood normal.     Foot in boot        Assessment   -Severe MR and severe mitral stenosis secondary to MAC, mild/moderate aortic stenosis, left atrium severely dilated, RA pressure elevated  -ESRD on peritoneal dialysis  -HTN  -DM  -non healing left foot ulcer  -EKG atrial " rhythm, poor r-r progression    Plan   -really only complaint fatigue  -cont. Statin, start aspirin  -dicussed options regarding valve.  Given his underlying co morbid conditions and anatomy of valve likely prohibitive operative risk.  His valvular disease given enough time will eventually be terminal.  Unclear how symptomatic from it.  Management essentially fluid management which will mostly be dialysis.  Would consider antibiotic prophylaxis prior to dental procedures      Tony Vidal MD    Return in about 3 months (around 2/3/2024).  Miguel Vidal MD  09/11/2023 15:18 EDT

## 2024-02-19 ENCOUNTER — TELEPHONE (OUTPATIENT)
Dept: ENDOCRINOLOGY | Facility: CLINIC | Age: 69
End: 2024-02-19

## 2024-02-19 NOTE — TELEPHONE ENCOUNTER
Arabella with [facility] called. She stated she spoke with Carline and Holly few times last week. She is needing forms signed by Dr. Haro or Jenn? She said we have the forms and that Carline is probably expecting her call? She said she will try back in a few if she does not hear from us, said the forms need to be done today. Please advise.     Her call back is [phone] .

## 2024-02-19 NOTE — TELEPHONE ENCOUNTER
Spoke to kana-advised elissa is out of office today.  She will call back if I need to fax over without RT signing

## 2024-02-22 ENCOUNTER — OFFICE VISIT (OUTPATIENT)
Dept: ENDOCRINOLOGY | Facility: CLINIC | Age: 69
End: 2024-02-22
Payer: MEDICARE

## 2024-02-22 VITALS
SYSTOLIC BLOOD PRESSURE: 130 MMHG | HEART RATE: 83 BPM | HEIGHT: 76 IN | BODY MASS INDEX: 26.35 KG/M2 | WEIGHT: 216.4 LBS | OXYGEN SATURATION: 96 % | DIASTOLIC BLOOD PRESSURE: 78 MMHG

## 2024-02-22 DIAGNOSIS — Z79.4 TYPE 2 DIABETES MELLITUS WITH HYPOGLYCEMIA WITHOUT COMA, WITH LONG-TERM CURRENT USE OF INSULIN: Primary | ICD-10-CM

## 2024-02-22 DIAGNOSIS — E11.649 TYPE 2 DIABETES MELLITUS WITH HYPOGLYCEMIA WITHOUT COMA, WITH LONG-TERM CURRENT USE OF INSULIN: Primary | ICD-10-CM

## 2024-02-22 LAB
EXPIRATION DATE: ABNORMAL
EXPIRATION DATE: ABNORMAL
GLUCOSE BLDC GLUCOMTR-MCNC: 146 MG/DL (ref 70–130)
HBA1C MFR BLD: 6.4 % (ref 4.5–5.7)
Lab: ABNORMAL
Lab: ABNORMAL

## 2024-02-22 PROCEDURE — 1159F MED LIST DOCD IN RCRD: CPT | Performed by: PHYSICIAN ASSISTANT

## 2024-02-22 PROCEDURE — 1160F RVW MEDS BY RX/DR IN RCRD: CPT | Performed by: PHYSICIAN ASSISTANT

## 2024-02-22 PROCEDURE — 3075F SYST BP GE 130 - 139MM HG: CPT | Performed by: PHYSICIAN ASSISTANT

## 2024-02-22 PROCEDURE — 99213 OFFICE O/P EST LOW 20 MIN: CPT | Performed by: PHYSICIAN ASSISTANT

## 2024-02-22 PROCEDURE — 3078F DIAST BP <80 MM HG: CPT | Performed by: PHYSICIAN ASSISTANT

## 2024-02-22 RX ORDER — PREDNISONE 2.5 MG/1
2.5 TABLET ORAL DAILY
COMMUNITY
Start: 2024-01-26

## 2024-02-22 NOTE — PROGRESS NOTES
"     Office Note      Date: 2024  Patient Name: Jaswant Henson  MRN: [id number]  : 1955    Chief Complaint   Patient presents with    Diabetes     Type 2       History of Present Illness:   Jaswant Henson is a 69 y.o. male who presents for follow-up for type 2 diabetes diagnosed in .  He reports he was having some low blood sugars and is having some issues with his stomach so he is not eating as much.  He is reduce his Lantus to 50 to 55 units daily.  He is still not checking his blood sugars very regularly but notes occasional symptoms of hypoglycemia early in the morning.  Reports he eats and feels better.  He continues on peritoneal dialysis and sees nephrology regularly.  He is up-to-date on his eye exam he had an appointment in the summer.  He reports he is no longer in the boot and was told the bone has healed but he continues to have trouble with swelling and pain in his foot.  He sees the podiatrist every 3 months.  He is no longer seeing the cardiologist because he was advised there was nothing they could do for him.  He is not a candidate for surgery.      Subjective     Review of Systems:   Review of Systems   Constitutional: Negative.    Cardiovascular:  Positive for leg swelling.   Gastrointestinal:  Positive for abdominal pain.   Endocrine: Negative.    Musculoskeletal:  Positive for arthralgias and myalgias.   Neurological: Negative.        The following portions of the patient's history were reviewed and updated as appropriate: allergies, current medications, past family history, past medical history, past social history, past surgical history, and problem list.    Objective     Vitals:    24 1259   BP: 130/78   Pulse: 83   SpO2: 96%   Weight: 98.2 kg (216 lb 6.4 oz)   Height: 193 cm (76\")     Body mass index is 26.34 kg/m².    Physical Exam  Vitals reviewed.   Constitutional:       General: He is not in acute distress.     Appearance: Normal appearance.      Comments: " Uses a cane to ambulate.   Neurological:      Mental Status: He is alert.         HEMOGLOBIN A1C  Lab Results   Component Value Date    HGBA1C 6.60 (H) 10/04/2023       GLUCOSE  Glucose   Date Value Ref Range Status   08/14/2023 207 (A) 70 - 130 mg/dL Final       CMP  Lab Results   Component Value Date    GLUCOSE 100 (H) 10/04/2023    BUN 38 (H) 10/04/2023    CREATININE 7.20 (H) 10/04/2023    EGFRIFNONA 6 (L) 09/08/2021    EGFRIFAFRI  09/08/2021      Comment:      <15 Indicative of kidney failure.    BCR 5.3 (L) 10/04/2023    K 3.8 10/04/2023    CO2 27.0 10/04/2023    CALCIUM 9.9 10/04/2023    PROTENTOTREF 6.9 09/29/2017    LABIL2 1.0 09/29/2017    AST 22 10/04/2023    ALT 25 10/04/2023       LIPID PANEL  Lab Results   Component Value Date    CHOL 148 10/04/2023    CHLPL 99 01/04/2016    TRIG 283 (H) 10/04/2023    HDL 25 (L) 10/04/2023    LDL 77 10/04/2023         TSH  Lab Results   Component Value Date    TSH 2.970 10/04/2023       Assessment / Plan      Assessment & Plan:  1. Type 2 diabetes mellitus with hypoglycemia without coma, with long-term current use of insulin  Hemoglobin A1c was 6.6% today.  He has had some trouble with hypoglycemia.  We discussed reducing his Lantus to 50 units regularly instead of taking 55 units.  He will reach out to me if he continues to have trouble with hypoglycemia.  His weight is down 1 pound since his appointment in August.  Blood pressure is okay today.      Return in about 6 months (around 8/22/2024).     This note was dictated using Dragon voice recognition.    Jenn Doan PA-C  02/22/2024

## 2024-09-10 ENCOUNTER — OFFICE VISIT (OUTPATIENT)
Dept: ENDOCRINOLOGY | Facility: CLINIC | Age: 69
End: 2024-09-10
Payer: MEDICARE

## 2024-09-10 VITALS
HEIGHT: 76 IN | HEART RATE: 92 BPM | BODY MASS INDEX: 23.02 KG/M2 | DIASTOLIC BLOOD PRESSURE: 72 MMHG | OXYGEN SATURATION: 99 % | SYSTOLIC BLOOD PRESSURE: 128 MMHG | WEIGHT: 189 LBS

## 2024-09-10 DIAGNOSIS — E11.649 TYPE 2 DIABETES MELLITUS WITH HYPOGLYCEMIA WITHOUT COMA, WITH LONG-TERM CURRENT USE OF INSULIN: Primary | ICD-10-CM

## 2024-09-10 DIAGNOSIS — Z79.4 TYPE 2 DIABETES MELLITUS WITH HYPOGLYCEMIA WITHOUT COMA, WITH LONG-TERM CURRENT USE OF INSULIN: Primary | ICD-10-CM

## 2024-09-10 LAB
EXPIRATION DATE: ABNORMAL
EXPIRATION DATE: ABNORMAL
GLUCOSE BLDC GLUCOMTR-MCNC: 253 MG/DL (ref 70–130)
HBA1C MFR BLD: 7.9 % (ref 4.5–5.7)
Lab: ABNORMAL
Lab: ABNORMAL

## 2024-09-10 PROCEDURE — 3074F SYST BP LT 130 MM HG: CPT | Performed by: PHYSICIAN ASSISTANT

## 2024-09-10 PROCEDURE — 1159F MED LIST DOCD IN RCRD: CPT | Performed by: PHYSICIAN ASSISTANT

## 2024-09-10 PROCEDURE — 3051F HG A1C>EQUAL 7.0%<8.0%: CPT | Performed by: PHYSICIAN ASSISTANT

## 2024-09-10 PROCEDURE — 3078F DIAST BP <80 MM HG: CPT | Performed by: PHYSICIAN ASSISTANT

## 2024-09-10 PROCEDURE — 83036 HEMOGLOBIN GLYCOSYLATED A1C: CPT | Performed by: PHYSICIAN ASSISTANT

## 2024-09-10 PROCEDURE — 82947 ASSAY GLUCOSE BLOOD QUANT: CPT | Performed by: PHYSICIAN ASSISTANT

## 2024-09-10 PROCEDURE — 1160F RVW MEDS BY RX/DR IN RCRD: CPT | Performed by: PHYSICIAN ASSISTANT

## 2024-09-10 PROCEDURE — 99213 OFFICE O/P EST LOW 20 MIN: CPT | Performed by: PHYSICIAN ASSISTANT

## 2024-09-10 RX ORDER — INSULIN GLARGINE 100 [IU]/ML
INJECTION, SOLUTION SUBCUTANEOUS
Qty: 15 ML | Refills: 6 | Status: SHIPPED | OUTPATIENT
Start: 2024-09-10

## 2024-09-10 NOTE — PROGRESS NOTES
Office Note      Date: 09/10/2024  Patient Name: Jaswant Henson  MRN: [id number]  : 1955    Chief Complaint   Patient presents with    Diabetes     Type 2 diabetes mellitus with diabetic polyneuropathy, with long-term current use of insulin       History of Present Illness:   Jaswant Henson is a 69 y.o. male who presents for follow-up for type 2 diabetes diagnosed in .  He has been hospitalized twice since his last appointment once for issues with his dialysis catheter and the second time with COVID and pneumonia for about a week in .  He reports he has not felt well since his hospitalization.  He reports he has had issues with his blood pressure dropping when he stands up and then his oxygen levels drop as well.  He reports they have adjusted his blood pressure medications as well as his dialysis regimen for peritoneal dialysis but he continues to have trouble.  He reports he did not have much of an appetite until recently and was not taking his Lantus regularly.  He reports he has been trying to remember to take this regularly recently.  He is down to 35 units daily.  He was taking 40 units but had some low blood sugar readings so he reduced the dose.  He did forget to take this last night which is why his glucose is elevated today.  He is accompanied by his wife today.      Subjective     Review of Systems:   Review of Systems   Constitutional: Negative.    Cardiovascular: Negative.    Gastrointestinal: Negative.    Endocrine: Negative.    Neurological: Negative.        The following portions of the patient's history were reviewed and updated as appropriate: allergies, current medications, past family history, past medical history, past social history, past surgical history, and problem list.    Objective     Vitals:    09/10/24 1333   BP: 128/72   BP Location: Left arm   Patient Position: Sitting   Cuff Size: Adult   Pulse: 92   SpO2: 99%   Weight: 85.7 kg (189 lb)   Height: 193 cm  "(76\")     Body mass index is 23.01 kg/m².    Physical Exam  Vitals reviewed.   Constitutional:       Appearance: Normal appearance. He is ill-appearing.      Comments: Sitting on his walker.   Neurological:      Mental Status: He is alert.         HEMOGLOBIN A1C  Lab Results   Component Value Date    HGBA1C 7.9 (A) 09/10/2024       GLUCOSE  Glucose   Date Value Ref Range Status   09/10/2024 253 (A) 70 - 130 mg/dL Final         Assessment / Plan      Assessment & Plan:  1. Type 2 diabetes mellitus with hypoglycemia without coma, with long-term current use of insulin  His hemoglobin A1c is up some at 7.9%.  This is acceptable.  We did discuss that his glucose is elevated today so it would be good to try to remember to take his low-dose Lantus regularly when he has a good appetite.  He will resume 30 units every evening.  His wife will help him remember this.  While he was in the office today when he stood up to come back to the exam room he felt dizzy and lightheaded his blood pressure had dropped to 108/54 with a heart rate of 150 and his oxygen saturation was 84%.  Once he rested in the room briefly his blood pressure was normal with normal heart rate and his oxygen saturation was back to 99%.  We took him out of the office in a wheelchair and he was fine transferring into the car.  We did discuss that he would if he was not feeling well he may need to consider going to the emergency room.  He will continue to discuss his trouble with his blood pressure dropping with his cardiologist and nephrologist.  His weight is down 27 pounds since his appointment in February.  We discussed that he may not need as much insulin at the lower weight.  - POC Glycosylated Hemoglobin (Hb A1C)  - POC Glucose, Blood      Return in about 3 months (around 12/10/2024) for Recheck.     This note was dictated using Dragon voice recognition.    Electronically signed by: VAIBHAV Burton  09/10/2024  "

## 2024-09-12 ENCOUNTER — TELEPHONE (OUTPATIENT)
Dept: ENDOCRINOLOGY | Facility: CLINIC | Age: 69
End: 2024-09-12
Payer: MEDICARE

## 2024-09-12 NOTE — TELEPHONE ENCOUNTER
Called to check on patient-- he reports he is feeling well today.  Has those spells off an on and never knows when they will occur.  He will contact us if he needs anything.  RT

## 2024-12-07 NOTE — TELEPHONE ENCOUNTER
Patient called stated he thinks his insulin needs to be increased. He said he was at his PCP recently and his A1C was 8.3. Please advise.    pts pulse ox off noticed by RN on monitor and upon assessment pt was OOB and fell on buttocks to floor. undetermined HS- XRAY and Head CT ordered- pt states he had to have BM and "couldn't hold it"